# Patient Record
Sex: MALE | Race: WHITE | HISPANIC OR LATINO | Employment: FULL TIME | URBAN - METROPOLITAN AREA
[De-identification: names, ages, dates, MRNs, and addresses within clinical notes are randomized per-mention and may not be internally consistent; named-entity substitution may affect disease eponyms.]

---

## 2018-04-04 ENCOUNTER — APPOINTMENT (EMERGENCY)
Dept: CT IMAGING | Facility: HOSPITAL | Age: 73
End: 2018-04-04
Payer: MEDICARE

## 2018-04-04 ENCOUNTER — HOSPITAL ENCOUNTER (EMERGENCY)
Facility: HOSPITAL | Age: 73
Discharge: HOME | End: 2018-04-05
Attending: EMERGENCY MEDICINE | Admitting: EMERGENCY MEDICINE
Payer: MEDICARE

## 2018-04-04 ENCOUNTER — APPOINTMENT (EMERGENCY)
Dept: RADIOLOGY | Facility: HOSPITAL | Age: 73
End: 2018-04-04
Payer: MEDICARE

## 2018-04-04 ENCOUNTER — OFFICE VISIT (OUTPATIENT)
Dept: URGENT CARE | Facility: MEDICAL CENTER | Age: 73
End: 2018-04-04
Payer: MEDICARE

## 2018-04-04 VITALS
DIASTOLIC BLOOD PRESSURE: 72 MMHG | OXYGEN SATURATION: 99 % | SYSTOLIC BLOOD PRESSURE: 126 MMHG | TEMPERATURE: 98.9 F | HEART RATE: 87 BPM | WEIGHT: 138 LBS

## 2018-04-04 DIAGNOSIS — I10 HYPERTENSION: ICD-10-CM

## 2018-04-04 DIAGNOSIS — R53.1 WEAKNESS: Primary | ICD-10-CM

## 2018-04-04 DIAGNOSIS — R60.0 LOWER EXTREMITY EDEMA: ICD-10-CM

## 2018-04-04 LAB
ALBUMIN SERPL BCP-MCNC: 3.6 G/DL (ref 3.5–5)
ALP SERPL-CCNC: 62 U/L (ref 46–116)
ALT SERPL W P-5'-P-CCNC: 19 U/L (ref 12–78)
ANION GAP SERPL CALCULATED.3IONS-SCNC: 11 MMOL/L (ref 4–13)
APTT PPP: 26 SECONDS (ref 23–35)
AST SERPL W P-5'-P-CCNC: 12 U/L (ref 5–45)
BACTERIA UR QL AUTO: ABNORMAL /HPF
BASOPHILS # BLD AUTO: 0.05 THOUSANDS/ΜL (ref 0–0.1)
BASOPHILS NFR BLD AUTO: 1 % (ref 0–1)
BILIRUB DIRECT SERPL-MCNC: 0.09 MG/DL (ref 0–0.2)
BILIRUB SERPL-MCNC: 0.3 MG/DL (ref 0.2–1)
BILIRUB UR QL STRIP: NEGATIVE
BUN SERPL-MCNC: 24 MG/DL (ref 5–25)
CALCIUM SERPL-MCNC: 10.1 MG/DL (ref 8.3–10.1)
CHLORIDE SERPL-SCNC: 104 MMOL/L (ref 100–108)
CLARITY UR: CLEAR
CO2 SERPL-SCNC: 28 MMOL/L (ref 21–32)
COLOR UR: YELLOW
CREAT SERPL-MCNC: 1.16 MG/DL (ref 0.6–1.3)
EOSINOPHIL # BLD AUTO: 0.46 THOUSAND/ΜL (ref 0–0.61)
EOSINOPHIL NFR BLD AUTO: 5 % (ref 0–6)
ERYTHROCYTE [DISTWIDTH] IN BLOOD BY AUTOMATED COUNT: 13.6 % (ref 11.6–15.1)
GFR SERPL CREATININE-BSD FRML MDRD: 63 ML/MIN/1.73SQ M
GLUCOSE SERPL-MCNC: 127 MG/DL (ref 65–140)
GLUCOSE SERPL-MCNC: 129 MG/DL (ref 65–140)
GLUCOSE UR STRIP-MCNC: NEGATIVE MG/DL
HCT VFR BLD AUTO: 37.5 % (ref 36.5–49.3)
HGB BLD-MCNC: 12 G/DL (ref 12–17)
HGB UR QL STRIP.AUTO: ABNORMAL
HYALINE CASTS #/AREA URNS LPF: ABNORMAL /LPF
INR PPP: 1.06 (ref 0.86–1.16)
KETONES UR STRIP-MCNC: NEGATIVE MG/DL
LACTATE SERPL-SCNC: 3.3 MMOL/L (ref 0.5–2)
LEUKOCYTE ESTERASE UR QL STRIP: NEGATIVE
LYMPHOCYTES # BLD AUTO: 1.82 THOUSANDS/ΜL (ref 0.6–4.47)
LYMPHOCYTES NFR BLD AUTO: 18 % (ref 14–44)
MCH RBC QN AUTO: 27.6 PG (ref 26.8–34.3)
MCHC RBC AUTO-ENTMCNC: 32 G/DL (ref 31.4–37.4)
MCV RBC AUTO: 86 FL (ref 82–98)
MONOCYTES # BLD AUTO: 0.54 THOUSAND/ΜL (ref 0.17–1.22)
MONOCYTES NFR BLD AUTO: 5 % (ref 4–12)
MUCOUS THREADS UR QL AUTO: ABNORMAL
NEUTROPHILS # BLD AUTO: 7.36 THOUSANDS/ΜL (ref 1.85–7.62)
NEUTS SEG NFR BLD AUTO: 72 % (ref 43–75)
NITRITE UR QL STRIP: NEGATIVE
NON-SQ EPI CELLS URNS QL MICRO: ABNORMAL /HPF
NRBC BLD AUTO-RTO: 0 /100 WBCS
NT-PROBNP SERPL-MCNC: 601 PG/ML
PH UR STRIP.AUTO: 5.5 [PH] (ref 4.5–8)
PLATELET # BLD AUTO: 268 THOUSANDS/UL (ref 149–390)
PMV BLD AUTO: 9.7 FL (ref 8.9–12.7)
POTASSIUM SERPL-SCNC: 3.8 MMOL/L (ref 3.5–5.3)
PROT SERPL-MCNC: 7.9 G/DL (ref 6.4–8.2)
PROT UR STRIP-MCNC: ABNORMAL MG/DL
PROTHROMBIN TIME: 14.1 SECONDS (ref 12.1–14.4)
RBC # BLD AUTO: 4.35 MILLION/UL (ref 3.88–5.62)
RBC #/AREA URNS AUTO: ABNORMAL /HPF
SL AMB POCT GLUCOSE BLD: 127
SODIUM SERPL-SCNC: 143 MMOL/L (ref 136–145)
SP GR UR STRIP.AUTO: 1.02 (ref 1–1.03)
TROPONIN I SERPL-MCNC: <0.02 NG/ML
TSH SERPL DL<=0.05 MIU/L-ACNC: 0.91 UIU/ML (ref 0.36–3.74)
UROBILINOGEN UR QL STRIP.AUTO: 0.2 E.U./DL
WBC # BLD AUTO: 10.27 THOUSAND/UL (ref 4.31–10.16)
WBC #/AREA URNS AUTO: ABNORMAL /HPF

## 2018-04-04 PROCEDURE — 36415 COLL VENOUS BLD VENIPUNCTURE: CPT | Performed by: EMERGENCY MEDICINE

## 2018-04-04 PROCEDURE — 82948 REAGENT STRIP/BLOOD GLUCOSE: CPT | Performed by: PHYSICIAN ASSISTANT

## 2018-04-04 PROCEDURE — G0463 HOSPITAL OUTPT CLINIC VISIT: HCPCS | Performed by: PHYSICIAN ASSISTANT

## 2018-04-04 PROCEDURE — 80076 HEPATIC FUNCTION PANEL: CPT | Performed by: EMERGENCY MEDICINE

## 2018-04-04 PROCEDURE — 84484 ASSAY OF TROPONIN QUANT: CPT | Performed by: EMERGENCY MEDICINE

## 2018-04-04 PROCEDURE — 70450 CT HEAD/BRAIN W/O DYE: CPT

## 2018-04-04 PROCEDURE — 99203 OFFICE O/P NEW LOW 30 MIN: CPT | Performed by: PHYSICIAN ASSISTANT

## 2018-04-04 PROCEDURE — 84443 ASSAY THYROID STIM HORMONE: CPT | Performed by: EMERGENCY MEDICINE

## 2018-04-04 PROCEDURE — 85610 PROTHROMBIN TIME: CPT | Performed by: EMERGENCY MEDICINE

## 2018-04-04 PROCEDURE — 85730 THROMBOPLASTIN TIME PARTIAL: CPT | Performed by: EMERGENCY MEDICINE

## 2018-04-04 PROCEDURE — 83605 ASSAY OF LACTIC ACID: CPT | Performed by: EMERGENCY MEDICINE

## 2018-04-04 PROCEDURE — 81001 URINALYSIS AUTO W/SCOPE: CPT | Performed by: EMERGENCY MEDICINE

## 2018-04-04 PROCEDURE — 85025 COMPLETE CBC W/AUTO DIFF WBC: CPT | Performed by: EMERGENCY MEDICINE

## 2018-04-04 PROCEDURE — 93005 ELECTROCARDIOGRAM TRACING: CPT | Performed by: PHYSICIAN ASSISTANT

## 2018-04-04 PROCEDURE — 80048 BASIC METABOLIC PNL TOTAL CA: CPT | Performed by: EMERGENCY MEDICINE

## 2018-04-04 PROCEDURE — 71046 X-RAY EXAM CHEST 2 VIEWS: CPT

## 2018-04-04 PROCEDURE — 87040 BLOOD CULTURE FOR BACTERIA: CPT | Performed by: EMERGENCY MEDICINE

## 2018-04-04 PROCEDURE — 96361 HYDRATE IV INFUSION ADD-ON: CPT

## 2018-04-04 PROCEDURE — 83880 ASSAY OF NATRIURETIC PEPTIDE: CPT | Performed by: EMERGENCY MEDICINE

## 2018-04-04 RX ORDER — ASPIRIN 81 MG/1
81 TABLET ORAL DAILY
COMMUNITY

## 2018-04-04 RX ADMIN — SODIUM CHLORIDE 1000 ML: 0.9 INJECTION, SOLUTION INTRAVENOUS at 21:41

## 2018-04-04 NOTE — PROGRESS NOTES
[address]        NAME: Mira Moise is a 71 y o  female  : 1945    MRN: [id number]  DATE: 2018  TIME: 6:53 PM    Assessment and Plan   Weakness [R53 1]  1  Weakness           Patient Instructions     Pt sent to [facility] for further evaluation and work up that could not be performed at our facility  Pt is stable to drive by car  Pt's friend will be driving  Chief Complaint     Chief Complaint   Patient presents with    Fatigue         History of Present Illness       This is a 72 y/o M c/o weakness/fatigue, confusion, chest palpations and lower extremity edema since early this morning  Pt is Armenian speaking only care giver is translating  Pt has pmh of stroke, Chronic DVT right lower extremity, DM II   Pt denies any chest pain, shortness of breath  Pt's friend reports he no longer seem confused and seems to be his normal self  Pt denies any numbness, tingling, loss of sensation, Ha, ringing in ears, changes in vision  Fatigue   Associated symptoms include fatigue  Review of Systems   Review of Systems   Constitutional: Positive for fatigue           Current Medications       Current Outpatient Prescriptions:     aspirin (ECOTRIN LOW STRENGTH) 81 mg EC tablet, Take 81 mg by mouth daily, Disp: , Rfl:     ATORVASTATIN CALCIUM PO, Take by mouth, Disp: , Rfl:     CARVEDILOL PO, Take by mouth, Disp: , Rfl:     CLOPIDOGREL BISULFATE PO, Take by mouth, Disp: , Rfl:     ENALAPRIL MALEATE PO, Take by mouth, Disp: , Rfl:     HYDROCHLOROTHIAZIDE PO, Take by mouth, Disp: , Rfl:     METFORMIN HCL PO, Take by mouth, Disp: , Rfl:     SITagliptin Phosphate (JANUVIA PO), Take by mouth, Disp: , Rfl:     Current Allergies     Allergies as of 2018    (No Known Allergies)            The following portions of the patient's history were reviewed and updated as appropriate: allergies, current medications, past family history, past medical history, past social history, past surgical history and problem list      Past Medical History:   Diagnosis Date    Diabetes mellitus ([facility] [address] )     Hypertension     Phlebitis     Stroke [facility])        Past Surgical History:   Procedure Laterality Date    ADENOIDECTOMY      CHOLECYSTECTOMY         No family history on file  Medications have been verified  Objective   /72 (Patient Position: Sitting)   Pulse 87   Temp 98 9 °F (37 2 °C) (Temporal)   Wt 62 6 kg (138 lb)   SpO2 99%     ECG: NSR, Nothing to compare to       Physical Exam     Physical Exam

## 2018-04-04 NOTE — PROGRESS NOTES
This am feeling tired  At noon had sluggish speech leg swelling, dizziness  Also feels his heart was racing   No chest pain or SOB

## 2018-04-04 NOTE — PATIENT INSTRUCTIONS
Pt sent to [facility] for further evaluation and work up that could not be performed at our facility  Pt is stable to drive by car  Pt's friend will be driving

## 2018-04-05 VITALS
WEIGHT: 138 LBS | DIASTOLIC BLOOD PRESSURE: 96 MMHG | HEIGHT: 61 IN | BODY MASS INDEX: 26.06 KG/M2 | HEART RATE: 91 BPM | TEMPERATURE: 97.7 F | RESPIRATION RATE: 16 BRPM | SYSTOLIC BLOOD PRESSURE: 202 MMHG | OXYGEN SATURATION: 98 %

## 2018-04-05 LAB
ATRIAL RATE: 81 BPM
LACTATE SERPL-SCNC: 1.2 MMOL/L (ref 0.5–2)
P AXIS: 7 DEGREES
PR INTERVAL: 138 MS
QRS AXIS: -22 DEGREES
QRSD INTERVAL: 80 MS
QT INTERVAL: 368 MS
QTC INTERVAL: 427 MS
T WAVE AXIS: 125 DEGREES
TROPONIN I SERPL-MCNC: <0.02 NG/ML
VENTRICULAR RATE: 81 BPM

## 2018-04-05 PROCEDURE — 99285 EMERGENCY DEPT VISIT HI MDM: CPT

## 2018-04-05 PROCEDURE — 96374 THER/PROPH/DIAG INJ IV PUSH: CPT

## 2018-04-05 PROCEDURE — 93010 ELECTROCARDIOGRAM REPORT: CPT | Performed by: INTERNAL MEDICINE

## 2018-04-05 RX ORDER — FUROSEMIDE 10 MG/ML
40 INJECTION INTRAMUSCULAR; INTRAVENOUS ONCE
Status: COMPLETED | OUTPATIENT
Start: 2018-04-05 | End: 2018-04-05

## 2018-04-05 RX ADMIN — FUROSEMIDE 40 MG: 10 INJECTION, SOLUTION INTRAMUSCULAR; INTRAVENOUS at 00:13

## 2018-04-05 NOTE — DISCHARGE INSTRUCTIONS
We will call you if any of your cultures come back positive or if he needs any antibiotics  Return if any problems  Hipertensión crónica, Cuidados ambulatorios   INFORMACIÓN GENERAL:   La hipertensión crónica  es mansi condición a eulalio plazo en la cual leo presión arterial es más octaviano de lo normal  La presión arterial es la fuerza que ejerce la sandra contra las hathaway de las arterias  La hipertensión es mansi presión arterial de 140/90 o mucho más elevada  Los siguientes son los síntomas más comunes:   · Dolor de judith     · Visión borrosa     · Dolor en el pecho     · [employer] o debilidad    · Dificultad para respirar     · Sangrados nasales  Busque atención médica inmediata al presentar los siguientes síntomas:   · Leslie dolor de judith o pérdida de la visión    · Debilidad en un brazo o mansi pierna     · Confusión o tiene dificultad para hablar claramente    · Siente mansi molestia en leo pecho que parece kiya si lo estuvieran apretando, mansi presión, mansi pesadez o dolor     · De repente se siente mareado o tiene dificultad para respirar    · Dolor o incomodidad en leo espalda, tiffany, mandíbula, estómago o brazo  El tratamiento para la hipertensión crónica  puede incluir un medicamento para baja la presión arterial  También necesitará hacer un cambio en leo estilo de karel  Se debe robe abimbola medicamentos exactamente kiya se lo indicaron  Controle la hipertensión crónica:   · Tómese la presión arterial en leo casa  Siéntese y descanse por 5 minutos antes de tomarse la presión arterial  Extienda leo brazo y apóyelo en mansi superficie plana  Leo brazo debe estar a la misma altura que leo corazón  Siga las instrucciones que vienen con el monitor para la presión arterial o tensiómetro  Si es posible tome por lo menos 2 lecturas de la presión cada vez  Tómese la presión arterial por lo [employer] al día a la misma hora todos los días, por ejemplo mansi en la mañana y la otra en la noche   Mantenga un registro de abimbola lecturas de candelario presión arterial y llévelo consigo a abimbola consultas de control  · Consuma menos sodio  No le añada sodio a los alimentos  Limete el consumo de alimentos que contienen un alto nivel de sodio, kiya los alimentos [place], papitas saladas de [employer], y sinan para sandwich  Candelario proveedor de kristopher puede recomendarle que siga el régimen de alimentación denominada enfoque dietético para disminuir la hipertensión (o DASH, por abimbola siglas en inglés)  El régimen es bajo en sodio, grasas saturadas y las grasas en total  Es octaviano en potasio, calcio y Ashley  · Peg Alba regular  Zara actividad física que sobrepase los 30 minutos al día shelly todos los días de la [place] ayudará a bajar candelario presión arterial  Solicítele a candelario proveedor de [employer] recomiende el plan de actividad física que se ajuste a candelario situación personal      · Limite el consumo de bebidas alcohólicas  Las mujeres deberían limitar candelario consumo de alcohol a 1 trago por día  Los hombres deberían limitar candelario consumo de alcohol a 2 tragos por día  Se considera un trago de alcohol 12 onzas (350 ml) de cerveza, 5 onzas (150 ml) de vino o 1 ½ onza (45 ml) de licor  · No fume  Si usted fuma, nunca es tarde para dejar de fumar  El tabaco puede aumentar candelario presión arterial  El tabaquismo también puede empeorar otras afecciones de kristopher que usted padezca las cuales pueden aumentar candelario riesgo de presentar hipertensión  Solicite a candelario proveedor de [employer] información si requiere ayuda para dejar de fumar  Stephenson Arley a candelario alicia de control con candelario proveedor de [employer] se lo indicaron:  Usted necesitará regresar para que le revisen candelario presión arterial y para que le ordenen otros exámenes de laboratorio  Escriba las preguntas que tenga para que recuerde hacerlas arminda abimbola consultas médicas  ACUERDOS SOBRE CANDELARIO CUIDADO:   Mnio tiene el derecho de participar en la planificación de candelario cuidado   Aprenda todo lo que pueda [facility] y kiya darle [place]  Discuta con abimbola médicos abimbola opciones de tratamiento para juntos decidir el cuidado que usted quiere recibir  Usted siempre tiene el derecho a rechazar leo tratamiento  Esta información es sólo para uso en educación  Leo intención no es darle un consejo médico sobre enfermedades o tratamientos  Colsulte con leo Alben Nicolás farmacéutico antes de seguir cualquier régimen médico para saber si es seguro y efectivo para usted  © 2014 [address] is for End User's use only and may not be sold, redistributed or otherwise used for commercial purposes  All illustrations and images included in CareNotes® are the copyrighted property of A D A M , Inc  or Jin Buchanan  Debilidad   LO QUE NECESITA SABER:   La debilidad es la pérdida de fuerza muscular  Podría ser causada por problemas en el cerebro, los nervios o músculos  [employer] física y mental kiya un problema del corazón, un embarazo, la deshidratación o la depresión también podrían causar debilidad  Zara reacción a ciertos medicamentos pueden causar debilidad  Algunas partes de leo cuerpo podrían debilitarse cuando usted [place] un yeso o férula o ha tenido que guardar cama por un eulalio periodo de [place]  INSTRUCCIONES SOBRE EL [facility]:   Llame al 911 en darell de presentar lo siguiente:   · Usted tiene alguno de los siguientes signos de derrame cerebral:      ¨ Adormecimiento o caída de un lado de leo tamy     ¨ Debilidad en un brazo o zara pierna    ¨ Confusión o debilidad para hablar    ¨ Mareos o dolor de judith intenso, o pérdida de la visión  · Usted pierde sensibilidad en la parte del cuerpo debilitada  · Usted siente sensaciones kiya de choques eléctricos que le bajan por los brazos y piernas cuando flexiona o mueve el tiffany  · Usted tiene dificultad repentina para hablar, tragar o respirar o la dificultad que ya tenía más esteban FAUSTO    Regrese a la pacheco de emergencias si:   · Usted tiene un david dolor en la espalda, brazos o piernas que va empeorando  · Usted tiene debilidad muscular y pérdida de movimiento repentinos o estos problemas [employer]       · Usted no puede controlar abimbola ganas de orinar o de tener mansi deposición intestinal   Pregúntele a leo médico qué vitaminas y minerales son adecuados para usted  · Usted se siente deprimido o ansioso  · Usted tiene preguntas o inquietudes acerca de leo condición o cuidado  Controle la debilidad:   · Use dispositivos de apoyo kiya se le indique  Estos artículos ayudan a protegerlo de que sufra mansi lesión  Unos ejemplos incluyen un caminador o bastón  Pídale a alguien que instale barras de seguridad en la casa  Las barras le ayudaran a mantener más el equilibrio cuando sale de la mayur del baño o se pone de pie después de usar el sanitario  Use un asiento de ducha  Siéntese en el inodoro o en otra silla para secarse y ponerse la ropa  Siempre es mejor que solicite la ayuda de alguien para subir y bajar gradas si abimbola piernas están débiles  · Acuda a fisioterapia o terapia ocupacional si se lo indican  Un fisioterapeuta puede enseñarle ejercicios para ayudar a fortalecer los músculos  Un terapeuta ocupacional puede enseñarle cómo hacer abimbola actividades cotidianas con más facilidad  Por [place] y los tenedores livianos pueden ser más fáciles de usar si tiene debilidad en la [place]  También puede aprender a organizar abimbola artículos del hogar para no  objetos pesados  · Reparta el tiempo igualmente entre el ejercicio y el descanso  El ejercicio puede aumentar la fuerza muscular y la [place]  No krishna ejercicio por largos periodos de tiempo a la vez  [place]well descansos con frecuencia  Al hacer demasiado ejercicio puede causar mansi tensión muscular o hacer que se sienta más cansado  Pregunte a leo médico cuál es la cantidad de ejercicio que es la [place] para usted  · Consuma alimentos saludables y variados  [place] demasiado o comer muy poco podría causarle debilidad o cansancio  Pregúntele a little médico cuáles son las cantidades saludables de comida en little darell  [address] frutas, verduras, pan integral, productos lácteos descremados, carne magra y pescado, semillas y legumbres cocinadas  · No fume  La nicotina y otros químicos en los cigarrillos y puros pueden empeorar abimbola síntomas y causar daño a los pulmones  Pida información a little médico si usted actualmente fuma y necesita ayuda para dejar de fumar  Los cigarrillos electrónicos o tabaco sin humo todavía contienen nicotina  Consulte con little médico antes de [employer]  · No use cafeína, ni alcohol ni drogas ilícitas  Estos pueden causar espasmos musculares, lo cual podría conllevar a un empeoramiento de little debilidad  Acuda a abimbola consultas de control con ilttle médico según le indicaron  Anote abimbola preguntas para que se acuerde de hacerlas arminda abimbola visitas  © 2017 [address] is for End User's use only and may not be sold, redistributed or otherwise used for commercial purposes  All illustrations and images included in CareNotes® are the copyrighted property of A D A M , Inc  or Jin Buchanan  Esta información es sólo para uso en educación  Little intención no es darle un consejo médico sobre enfermedades o tratamientos  Colsulte con little Melven Rimes farmacéutico antes de seguir cualquier régimen médico para saber si es seguro y efectivo para usted  Edema de la pierna   LO QUE NECESITA SABER:   El edema de la pierna es mansi inflamación causada por la acumulación de líquido  Las piernas pueden hincharse si usted está sentado o de pie arminda largos períodos de [place], está [place], o está lesionado  La inflamación también se puede presentar si usted tiene insuficiencia cardíaca o problemas de la circulación   [place] significa que little corazón no bombea sandra a little cuerpo [employer] debería  INSTRUCCIONES SOBRE EL [facility]:   Cuidados personales:   · Eleve cindy piernas:  Levante cindy piernas por encima del nivel de little corazón tan seguido kiya pueda  [place] va a disminuir inflamación y el dolor  Coloque cindy piernas sobre almohadas o cobijas para mantenerlas elevadas cómodamente  · Use medias de compresión:  Estas medias apretadas generan presión en cindy piernas para promover la circulación de sandra y prevenir un coágulo de [place]  Use las medias arminda el día  No las use al dormir  · Aplique calor:  El calor ayuda a disminuir el dolor y la inflamación  Aplíquese calor en el área lesionada arminda 20 a 30 minutos cada 2 horas arminda la cantidad de [employer] indiquen  · Manténgase activo:  No esté de pie o sentado por mucho tiempo  Pregunte a little médico acerca del mejor plan de ejercicio para usted  · Consuma alimentos saludables:  Los alimentos saludables incluyen frutas, verduras, pan integral, productos lácteos bajos en grasa, frijoles, sinan magras y pescado  Pregunte si necesita seguir mansi dieta especial  Limite la sae  La sal hará que little cuerpo retenga aún más líquidos  Acuda a cindy consultas de control con little médico según le indicaron  Anote cindy preguntas para que se acuerde de hacerlas arminda cindy visitas  Pregúntele a little [place] vitaminas y minerales son adecuados para usted  · Tiene fiebre o se siente más cansado de lo normal     · Las venas en cindy piernas se heaven más grandes de lo normal  Se podrían notar llenas o estar abultadas  · Cindy piernas le pican o se sienten pesadas  · Tiene áreas o llagas [place] o kimmy en cindy piernas  Little piel podría parecer con hoyuelos o podría tener hendiduras  · Está subiendo de [place]  · Tiene dificultad para  los tobillos  · La inflamación no desaparece, u otras partes de little cuerpo se hinchan  · Usted tiene preguntas o inquietudes acerca de little condición o cuidado    Regrese a la pacheco de emergencias si: · No puede caminar  · Se siente desmayar o confundido  · Little piel se ha puesto suzanne o eda  · Little pierna se siente cálida, sensible y [place]  Puede que estén inflamados y rojos  · Tiene dolor de pecho o dificultad para respirar que es peor que cuando se acostó  · De repente se siente mareado y tiene dificultad para respirar  · Le viene repentinamente un nuevo dolor en el pecho  Es probable que sienta más dolor cuando respira profundo o tose  Es posible que también expectore Sarmad mcclure  © 2017 [address] Information is for End User's use only and may not be sold, redistributed or otherwise used for commercial purposes  All illustrations and images included in CareNotes® are the copyrighted property of A D A YI , Inc  or Jin Buchanan  Esta información es sólo para uso en educación  Little intención no es darle un consejo médico sobre enfermedades o tratamientos  Colsulte con little Zelpha Roch farmacéutico antes de seguir cualquier régimen médico para saber si es seguro y efectivo para usted

## 2018-04-05 NOTE — ED PROVIDER NOTES
History  Chief Complaint   Patient presents with    Weakness - Generalized     Pt presents to ED for generalized weakness, fatigue, and increased leg swelling  Pt states it also feels like his heart is racing  Symptoms started today      HPI   59-year-old  male with a chief complaint from his daughter that patient was very weak this morning and did not get out of bed till 1:00 pm   Daughter states that patient was complaining that his heart was racing  Patient was seen at urgent care and they sent patient over here because of his history strokes in the past   Daughter also states that patient is complaining of increased swelling in his legs  Patient has no complaints at the present time  Prior to Admission Medications   Prescriptions Last Dose Informant Patient Reported? Taking? ATORVASTATIN CALCIUM PO  Friend Yes No   Sig: Take by mouth   CARVEDILOL PO  Friend Yes No   Sig: Take by mouth   CLOPIDOGREL BISULFATE PO  Friend Yes No   Sig: Take by mouth   ENALAPRIL MALEATE PO  Friend Yes No   Sig: Take by mouth   HYDROCHLOROTHIAZIDE PO  Friend Yes No   Sig: Take by mouth   METFORMIN HCL PO  Friend Yes No   Sig: Take by mouth   SITagliptin Phosphate (JANUVIA PO)  Friend Yes No   Sig: Take by mouth   aspirin (ECOTRIN LOW STRENGTH) 81 mg EC tablet  Friend Yes No   Sig: Take 81 mg by mouth daily      Facility-Administered Medications: None       Past Medical History:   Diagnosis Date    Diabetes mellitus ([facility] [address] )     Hypertension     Phlebitis     Stroke [facility])        Past Surgical History:   Procedure Laterality Date    ADENOIDECTOMY      CHOLECYSTECTOMY         History reviewed  No pertinent family history  I have reviewed and agree with the history as documented  Social History   Substance Use Topics    Smoking status: Never Smoker    Smokeless tobacco: Never Used    Alcohol use No        Review of Systems   Constitutional: Negative for diaphoresis, fatigue and fever     HENT: Negative for congestion, ear pain, nosebleeds and sore throat  Eyes: Negative for photophobia, pain, discharge and visual disturbance  Respiratory: Negative for cough, choking, chest tightness, shortness of breath and wheezing  Cardiovascular: Positive for palpitations  Negative for chest pain  Gastrointestinal: Negative for abdominal distention, abdominal pain, diarrhea and vomiting  Genitourinary: Negative for dysuria, flank pain and frequency  Musculoskeletal: Negative for back pain, gait problem and joint swelling  Skin: Negative for color change and rash  Neurological: Positive for weakness  Negative for dizziness, syncope and headaches  Psychiatric/Behavioral: Negative for behavioral problems and confusion  The patient is not nervous/anxious  All other systems reviewed and are negative  Physical Exam  ED Triage Vitals [04/04/18 1938]   Temperature Pulse Respirations Blood Pressure SpO2   97 7 °F (36 5 °C) 86 17 (!) 173/90 99 %      Temp Source Heart Rate Source Patient Position - Orthostatic VS BP Location FiO2 (%)   Oral Monitor Sitting Left arm --      Pain Score       --           Orthostatic Vital Signs  Vitals:    04/04/18 2145 04/04/18 2330 04/05/18 0000 04/05/18 0030   BP: (!) 194/92 (!) 199/93 (!) 202/96    Pulse: 85 79 79 91   Patient Position - Orthostatic VS: Lying          Physical Exam   Constitutional: He is oriented to person, place, and time  He appears well-developed and well-nourished  78-year-old  male sitting on the stretcher acute distress  Patient did not speak any English but does follow all commands  Daughter is the    HENT:   Head: Normocephalic and atraumatic  Mouth/Throat: Oropharynx is clear and moist    Eyes: EOM are normal  Pupils are equal, round, and reactive to light  Neck: Normal range of motion  Neck supple  Cardiovascular: Normal rate, regular rhythm and normal heart sounds      Pulmonary/Chest: Effort normal and breath sounds normal  No respiratory distress  He has no wheezes  He exhibits no tenderness  Abdominal: Soft  Bowel sounds are normal  There is no tenderness  There is no rebound and no guarding  Musculoskeletal: Normal range of motion  Neurological: He is alert and oriented to person, place, and time  No cranial nerve deficit  He exhibits normal muscle tone  Coordination normal    Skin: Skin is warm and dry  Psychiatric: He has a normal mood and affect  Nursing note and vitals reviewed  ED Medications  Medications   sodium chloride 0 9 % bolus 1,000 mL (0 mL Intravenous Stopped 4/4/18 2311)   furosemide (LASIX) injection 40 mg (40 mg Intravenous Given 4/5/18 0013)       Diagnostic Studies  Results Reviewed     Procedure Component Value Units Date/Time    Lactic acid, plasma [[id number]]  (Normal) Collected:  04/04/18 2347    Lab Status:  Final result Specimen:  Blood from Arm, Right Updated:  04/05/18 0016     LACTIC ACID 1 2 mmol/L     Narrative:         Result may be elevated if tourniquet was used during collection  Troponin I [[id number]]  (Normal) Collected:  04/04/18 2347    Lab Status:  Final result Specimen:  Blood from Arm, Right Updated:  04/05/18 0011     Troponin I <0 02 ng/mL     Narrative:         [employer] Chemistry analyzer 99% cutoff is > 0 04 ng/mL in network labs    o cTnI 99% cutoff is useful only when applied to patients in the clinical setting of myocardial ischemia  o cTnI 99% cutoff should be interpreted in the context of clinical history, ECG findings and possibly cardiac imaging to establish correct diagnosis  o cTnI 99% cutoff may be suggestive but clearly not indicative of a coronary event without the clinical setting of myocardial ischemia      Troponin I [[id number]]     Lab Status:  No result Specimen:  Blood     Urine Microscopic [[id number]]  (Abnormal) Collected:  04/04/18 2314    Lab Status:  Final result Specimen:  Urine from Urine, Other Updated:  04/04/18 2332     RBC, UA 0-1 (A) /hpf      WBC, UA None Seen /hpf      Epithelial Cells None Seen /hpf      Bacteria, UA None Seen /hpf      Hyaline Casts, UA 0-1 (A) /lpf      MUCOUS THREADS None Seen (A)    UA w Reflex to Microscopic w Reflex to Culture [[id number]]  (Abnormal) Collected:  04/04/18 2314    Lab Status:  Final result Specimen:  Urine from Urine, Other Updated:  04/04/18 2322     Color, UA Yellow     Clarity, UA Clear     Specific Gravity, UA 1 025     pH, UA 5 5     Leukocytes, UA Negative     Nitrite, UA Negative     Protein,  (2+) (A) mg/dl      Glucose, UA Negative mg/dl      Ketones, UA Negative mg/dl      Urobilinogen, UA 0 2 E U /dl      Bilirubin, UA Negative     Blood, UA Trace-Intact (A)    Blood culture #2 [[id number]] Collected:  04/04/18 2315    Lab Status: In process Specimen:  Blood from Arm, Left Updated:  04/04/18 2320    Blood culture #1 [[id number]]     Lab Status:  No result Specimen:  Blood     Lactic acid, plasma [[id number]]  (Abnormal) Collected:  04/04/18 2136    Lab Status:  Final result Specimen:  Blood from Arm, Right Updated:  04/04/18 2213     LACTIC ACID 3 3 (HH) mmol/L     Narrative:         Result may be elevated if tourniquet was used during collection  Hepatic function panel [[id number]]  (Normal) Collected:  04/04/18 2136    Lab Status:  Final result Specimen:  Blood from Arm, Right Updated:  04/04/18 2210     Total Bilirubin 0 30 mg/dL      Bilirubin, Direct 0 09 mg/dL      Alkaline Phosphatase 62 U/L      AST 12 U/L      ALT 19 U/L      Total Protein 7 9 g/dL      Albumin 3 6 g/dL     TSH [[id number]]  (Normal) Collected:  04/04/18 2136    Lab Status:  Final result Specimen:  Blood from Arm, Right Updated:  04/04/18 2210     TSH 3RD GENERATON 0 908 uIU/mL     Narrative:         Patients undergoing fluorescein dye angiography may retain small amounts of fluorescein in the body for 48-72 hours post procedure  Samples containing fluorescein can produce falsely depressed TSH values   If the patient had this procedure,a specimen should be resubmitted post fluorescein clearance  B-type natriuretic peptide [[id number]]  (Abnormal) Collected:  04/04/18 2136    Lab Status:  Final result Specimen:  Blood from Arm, Right Updated:  04/04/18 2210     NT-proBNP 601 (H) pg/mL     Troponin I [[id number]]  (Normal) Collected:  04/04/18 2136    Lab Status:  Final result Specimen:  Blood from Arm, Right Updated:  04/04/18 2205     Troponin I <0 02 ng/mL     Narrative:         [employer] Chemistry analyzer 99% cutoff is > 0 04 ng/mL in network labs    o cTnI 99% cutoff is useful only when applied to patients in the clinical setting of myocardial ischemia  o cTnI 99% cutoff should be interpreted in the context of clinical history, ECG findings and possibly cardiac imaging to establish correct diagnosis  o cTnI 99% cutoff may be suggestive but clearly not indicative of a coronary event without the clinical setting of myocardial ischemia  Basic metabolic panel [[id number]] Collected:  04/04/18 2136    Lab Status:  Final result Specimen:  Blood from Arm, Right Updated:  04/04/18 2202     Sodium 143 mmol/L      Potassium 3 8 mmol/L      Chloride 104 mmol/L      CO2 28 mmol/L      Anion Gap 11 mmol/L      BUN 24 mg/dL      Creatinine 1 16 mg/dL      Glucose 129 mg/dL      Calcium 10 1 mg/dL      eGFR 63 ml/min/1 73sq m     Narrative:         National Kidney Disease Education Program recommendations are as follows:  GFR calculation is accurate only with a steady state creatinine  Chronic Kidney disease less than 60 ml/min/1 73 sq  meters  Kidney failure less than 15 ml/min/1 73 sq  meters      Protime-INR [[id number]]  (Normal) Collected:  04/04/18 2136    Lab Status:  Final result Specimen:  Blood from Arm, Right Updated:  04/04/18 2155     Protime 14 1 seconds      INR 1 06    APTT [[id number]]  (Normal) Collected:  04/04/18 2136    Lab Status:  Final result Specimen:  Blood from Arm, Right Updated:  04/04/18 2155     PTT 26 seconds Narrative: Therapeutic Heparin Range = 60-90 seconds    CBC and differential [[id number]]  (Abnormal) Collected:  04/04/18 2136    Lab Status:  Final result Specimen:  Blood from Arm, Right Updated:  04/04/18 2143     WBC 10 27 (H) Thousand/uL      RBC 4 35 Million/uL      Hemoglobin 12 0 g/dL      Hematocrit 37 5 %      MCV 86 fL      MCH 27 6 pg      MCHC 32 0 g/dL      RDW 13 6 %      MPV 9 7 fL      Platelets 981 Thousands/uL      nRBC 0 /100 WBCs      Neutrophils Relative 72 %      Lymphocytes Relative 18 %      Monocytes Relative 5 %      Eosinophils Relative 5 %      Basophils Relative 1 %      Neutrophils Absolute 7 36 Thousands/µL      Lymphocytes Absolute 1 82 Thousands/µL      Monocytes Absolute 0 54 Thousand/µL      Eosinophils Absolute 0 46 Thousand/µL      Basophils Absolute 0 05 Thousands/µL                  XR chest 2 views   ED Interpretation by Liana Hale DO (04/05 0003)   NAD      CT head without contrast   Final Result by James Sena MD (04/04 2233)         1  Chronic left MCA territory infarct in the inferior aspect of the motor strip  Microangiopathic disease  2   No acute intracranial hemorrhage, mass effect or extra-axial collection  3   Chronic paranasal sinus disease  Workstation performed: [id number]                    Procedures  ECG 12 Lead Documentation  Date/Time: 4/5/2018 12:16 AM  Performed by: Jo Montana  Authorized by: Jo Montana     Indications / Diagnosis:  Weakness  ECG reviewed by me, the ED Provider: yes    Patient location:  ED  Interpretation:     Interpretation: normal    Rate:     ECG rate assessment: normal    Rhythm:     Rhythm: sinus rhythm    ST segments:     ST segments:  Non-specific  T waves:     T waves: flattening      Flattening:  V5 and V6           Phone Contacts  ED Phone Contact    ED Course  ED Course           patient examined multiple times - patient still has no complaints feeling fine    Patient ate some crackers and juice  Awaiting 2nd lactic acid  Explained to daughter with patient that we did a blood culture and if there was anything that came back positive we would give them a call  Patient is also hypertensive and because of his pedal edema I will give him 1 dose of IV Lasix here prior to discharging patient  Daughter was given a copy of patient's CT scan  Repeat lactic acid was 1 1 dropped from 3 3 after IV hydration  Patient was still asymptomatic and felt fine  Patient was discharged and was able to ambulate without difficulty  MDM  CritCare Time     Differential diagnosis includes:  1  Weakness  2  Rule out CVA  3  Electrolyte imbalance  4  Palpitations  5  Rule out MI  6  Duque Schanz UTI  7  B/l lower extremity edema    Disposition  Final diagnoses:   Weakness   Hypertension   Lower extremity edema     Time reflects when diagnosis was documented in both MDM as applicable and the Disposition within this note     Time User Action Codes Description Comment    4/5/2018 12:13 AM Wallie Fan Add [R53 1] Weakness     4/5/2018 12:13 AM Wallie Fan Add [I10] Hypertension     4/5/2018 12:23 AM Wallie Fan Add [R60 0] Lower extremity edema       ED Disposition     ED Disposition Condition Comment    Discharge  [address] discharge to home/self care      Condition at discharge: Good        Follow-up Information     Follow up With Specialties Details Why Contact Info    Grecia Yepez MD Family Medicine In 1 week  [address]  [phone]          Discharge Medication List as of 4/5/2018 12:25 AM      CONTINUE these medications which have NOT CHANGED    Details   aspirin (ECOTRIN LOW STRENGTH) 81 mg EC tablet Take 81 mg by mouth daily, Historical Med      ATORVASTATIN CALCIUM PO Take by mouth, Historical Med      CARVEDILOL PO Take by mouth, Historical Med      CLOPIDOGREL BISULFATE PO Take by mouth, Historical Med      ENALAPRIL MALEATE PO Take by mouth, Historical Med      HYDROCHLOROTHIAZIDE PO Take by mouth, Historical Med      METFORMIN HCL PO Take by mouth, Historical Med      SITagliptin Phosphate (JANUVIA PO) Take by mouth, Historical Med           No discharge procedures on file      ED Provider  Electronically Signed by           Eduardo Garcia DO  04/05/18 6883

## 2018-04-05 NOTE — ED NOTES
Patient's daughter at bedside refusing secondary set of blood cultures for the patient, advising he does not want any more needle sticks   Patient's nurse updated with refusal       Ross Notice  04/04/18 5302

## 2018-04-10 LAB — BACTERIA BLD CULT: NORMAL

## 2022-04-07 ENCOUNTER — NURSING HOME VISIT (OUTPATIENT)
Dept: FAMILY MEDICINE CLINIC | Facility: CLINIC | Age: 77
End: 2022-04-07
Payer: MEDICARE

## 2022-04-07 DIAGNOSIS — R26.9 GAIT ABNORMALITY: ICD-10-CM

## 2022-04-07 DIAGNOSIS — I10 BENIGN ESSENTIAL HYPERTENSION: ICD-10-CM

## 2022-04-07 DIAGNOSIS — E78.2 MIXED HYPERLIPIDEMIA: ICD-10-CM

## 2022-04-07 DIAGNOSIS — E11.9 TYPE 2 DIABETES MELLITUS WITHOUT COMPLICATION, WITHOUT LONG-TERM CURRENT USE OF INSULIN (HCC): ICD-10-CM

## 2022-04-07 DIAGNOSIS — Z78.9 LANGUAGE BARRIER: ICD-10-CM

## 2022-04-07 DIAGNOSIS — I63.419 CEREBROVASCULAR ACCIDENT (CVA) DUE TO EMBOLISM OF MIDDLE CEREBRAL ARTERY, UNSPECIFIED BLOOD VESSEL LATERALITY (HCC): Primary | ICD-10-CM

## 2022-04-07 PROCEDURE — 99306 1ST NF CARE HIGH MDM 50: CPT | Performed by: FAMILY MEDICINE

## 2022-04-18 VITALS
BODY MASS INDEX: 19.66 KG/M2 | SYSTOLIC BLOOD PRESSURE: 116 MMHG | HEART RATE: 70 BPM | WEIGHT: 118 LBS | DIASTOLIC BLOOD PRESSURE: 62 MMHG | HEIGHT: 65 IN | RESPIRATION RATE: 16 BRPM | TEMPERATURE: 97.8 F

## 2022-04-18 PROBLEM — R80.9 ASYMPTOMATIC PROTEINURIA: Status: ACTIVE | Noted: 2018-03-05

## 2022-04-18 PROBLEM — D50.9 IRON DEFICIENCY ANEMIA: Status: ACTIVE | Noted: 2018-03-05

## 2022-04-18 PROBLEM — R26.9 GAIT ABNORMALITY: Status: ACTIVE | Noted: 2022-04-18

## 2022-04-18 PROBLEM — Z78.9 LANGUAGE BARRIER: Status: ACTIVE | Noted: 2022-04-18

## 2022-04-18 NOTE — ASSESSMENT & PLAN NOTE
Patient speaks little [place] and I do understand some Turkish today  Pt able to communicate needs to me today

## 2022-04-18 NOTE — PROGRESS NOTES
Assessment/Plan:         Problem List Items Addressed This Visit        Endocrine    Type 2 diabetes mellitus without complication ([facility] )     Arnie to check A1C and start diabetic diet and monitor sugars  No results found for: HGBA1C            Cardiovascular and Mediastinum    Benign essential hypertension     Patient is stable with current anti-hypertensive medicine and continue to follow a low sodium diet and take current medication  All questions about this condition were answered today  Other    Hyperlipidemia     Patient  is stable with current medication and we discussed a low fat low cholesterol diet  Weight loss also discussed for this will help lower cholesterol also  Recheck lipids in 6 months  Gait abnormality     Nees to start PT/OT         Language barrier     Patient speaks little [place] and I do understand some Slovak today  Pt able to communicate needs to me today  Other Visit Diagnoses     Cerebrovascular accident (CVA) due to embolism of middle cerebral artery, unspecified blood vessel laterality ([facility] )    -  Primary            Subjective:      Patient ID: Landry Bailey is a 77 y o  male  This is a 27-year-old  male admitted to the skilled nursing facility for rehab status post hospitalization  Patient has multiple medical problems which include hypertension history of CVA involving the middle cerebral artery gait dysfunction weakness history of phlebitis type 2 diabetes and language barrier  Patient is in good spirits today was able to communicate his needs to me  I do understand some of his Slovak and he is with no current pain and has a good sense of humor  His medicines have been reviewed and renewed  Also will get a see about checking his A1c to see where he is at with sugar control we will monitor sugars while he is in the facility    Patient will need physical therapy as well as occupational therapy to maximize his ADLs and to get stronger so he can go back home with his caretaker  The following portions of the patient's history were reviewed and updated as appropriate:   Past Medical History:  He has a past medical history of Diabetes mellitus ([facility] [address] ), Hypertension, Phlebitis, and Stroke ([facility] [address] )  ,  _______________________________________________________________________  Medical Problems:  does not have any pertinent problems on file ,  _______________________________________________________________________  Past Surgical History:   has a past surgical history that includes Cholecystectomy and ADENOIDECTOMY ,  _______________________________________________________________________  Family History:  family history is not on file ,  _______________________________________________________________________  Social History:   reports that he has never smoked  He has never used smokeless tobacco  He reports that he does not drink alcohol and does not use drugs  ,  _______________________________________________________________________  Allergies:  has No Known Allergies     _______________________________________________________________________  Current Outpatient Medications   Medication Sig Dispense Refill    aspirin (ECOTRIN LOW STRENGTH) 81 mg EC tablet Take 81 mg by mouth daily      ATORVASTATIN CALCIUM PO Take by mouth      CARVEDILOL PO Take by mouth      CLOPIDOGREL BISULFATE PO Take by mouth      ENALAPRIL MALEATE PO Take by mouth      HYDROCHLOROTHIAZIDE PO Take by mouth      METFORMIN HCL PO Take by mouth      SITagliptin Phosphate (JANUVIA PO) Take by mouth       No current facility-administered medications for this visit      _______________________________________________________________________  Review of Systems   Constitutional: Negative for activity change, appetite change, chills, fatigue, fever and unexpected weight change     HENT: Negative for congestion, ear pain, hearing loss, mouth sores, postnasal drip, sinus pressure, sinus pain, sneezing and sore throat  Respiratory: Negative for apnea, cough, shortness of breath and wheezing  Cardiovascular: Negative for chest pain, palpitations and leg swelling  Gastrointestinal: Negative for abdominal pain, constipation, diarrhea, nausea and vomiting  Endocrine: Negative for cold intolerance and heat intolerance  Genitourinary: Negative for dysuria, frequency and hematuria  Musculoskeletal: Positive for gait problem  Negative for arthralgias, back pain, joint swelling and neck pain  Skin: Negative for rash  Neurological: Positive for weakness  Negative for dizziness and numbness  Hematological: Does not bruise/bleed easily  Psychiatric/Behavioral: Negative for agitation, behavioral problems, confusion, hallucinations and sleep disturbance  The patient is not nervous/anxious  Objective:  Vitals:    04/07/22 1153   BP: 116/62   Pulse: 70   Resp: 16   Temp: 97 8 °F (36 6 °C)   Weight: 53 5 kg (118 lb)   Height: 5' 5" (1 651 m)     Body mass index is 19 64 kg/m²  Physical Exam  Vitals and nursing note reviewed  Constitutional:       Appearance: He is well-developed  HENT:      Head: Normocephalic and atraumatic  Nose: Nose normal       Mouth/Throat:      Mouth: Mucous membranes are moist    Eyes:      General: No scleral icterus  Conjunctiva/sclera: Conjunctivae normal       Pupils: Pupils are equal, round, and reactive to light  Neck:      Thyroid: No thyromegaly  Cardiovascular:      Rate and Rhythm: Normal rate and regular rhythm  Heart sounds: Normal heart sounds  Pulmonary:      Effort: Pulmonary effort is normal  No respiratory distress  Breath sounds: Normal breath sounds  No wheezing  Abdominal:      General: Bowel sounds are normal       Palpations: Abdomen is soft  Tenderness: There is no abdominal tenderness  There is no guarding or rebound  Musculoskeletal:         General: Normal range of motion        Cervical back: Normal range of motion and neck supple  Skin:     General: Skin is warm and dry  Findings: No rash  Neurological:      Mental Status: He is alert and oriented to person, place, and time  Motor: Weakness present  Gait: Gait abnormal    Psychiatric:         Mood and Affect: Mood normal          Behavior: Behavior normal          Thought Content:  Thought content normal          Judgment: Judgment normal

## 2022-04-28 ENCOUNTER — NURSING HOME VISIT (OUTPATIENT)
Dept: FAMILY MEDICINE CLINIC | Facility: CLINIC | Age: 77
End: 2022-04-28
Payer: MEDICARE

## 2022-04-28 VITALS
RESPIRATION RATE: 18 BRPM | WEIGHT: 119 LBS | BODY MASS INDEX: 19.83 KG/M2 | HEIGHT: 65 IN | HEART RATE: 72 BPM | TEMPERATURE: 97.1 F | SYSTOLIC BLOOD PRESSURE: 126 MMHG | DIASTOLIC BLOOD PRESSURE: 70 MMHG

## 2022-04-28 DIAGNOSIS — E11.9 TYPE 2 DIABETES MELLITUS WITHOUT COMPLICATION, WITHOUT LONG-TERM CURRENT USE OF INSULIN (HCC): Primary | ICD-10-CM

## 2022-04-28 DIAGNOSIS — R26.9 GAIT ABNORMALITY: ICD-10-CM

## 2022-04-28 DIAGNOSIS — I10 BENIGN ESSENTIAL HYPERTENSION: ICD-10-CM

## 2022-04-28 DIAGNOSIS — E78.2 MIXED HYPERLIPIDEMIA: ICD-10-CM

## 2022-04-28 PROCEDURE — 99316 NF DSCHRG MGMT 30 MIN+: CPT | Performed by: FAMILY MEDICINE

## 2022-04-29 NOTE — ASSESSMENT & PLAN NOTE
Patient is stable with current meds and discussed a low carb diet  Pt  recommended to see eye doctor and foot doctor for routine screening as per protocol  Recheck A1C  and Cr in 3 months  Patient questions answered today about this condtion    No results found for: HGBA1C

## 2022-04-29 NOTE — PROGRESS NOTES
Assessment/Plan:         Problem List Items Addressed This Visit        Endocrine    Type 2 diabetes mellitus without complication ([facility] [address] ) - Primary     Patient is stable with current meds and discussed a low carb diet  Pt  recommended to see eye doctor and foot doctor for routine screening as per protocol  Recheck A1C  and Cr in 3 months  Patient questions answered today about this condtion  No results found for: HGBA1C            Cardiovascular and Mediastinum    Benign essential hypertension     Patient is stable with current anti-hypertensive medicine and continue to follow a low sodium diet and take current medication  All questions about this condition were answered today  Other    Hyperlipidemia     Patient  is stable with current medication and we discussed a low fat low cholesterol diet  Weight loss also discussed for this will help lower cholesterol also  Recheck lipids in 6 months  Gait abnormality     Improved and safe to go home  With [facility] to continue  therapy                 Subjective:      Patient ID: Tamar Pak is a 77 y o  male  This 78-year-old  male seen examined at skilled nursing facility for his discharge to home tomorrow  Patient has multiple medical problems include gait dysfunction history of stroke hypertension phlebitis and type 2 diabetes patient also had lie per lipidemia  Patient did with his physical therapy and is stable to go home with his family  He will continue with visiting nurses for physical therapy as well as occupational therapy  Patient also had 30 day supply of his prescriptions written out for him to go home with  Discussed with the staff at the home his ability to go home and services that will be in place for him  Total time for discharge is approximately 32 minutes        The following portions of the patient's history were reviewed and updated as appropriate:   Past Medical History:  He has a past medical history of Diabetes mellitus ([address] ), Hypertension, Phlebitis, and Stroke ([address] )  ,  _______________________________________________________________________  Medical Problems:  does not have any pertinent problems on file ,  _______________________________________________________________________  Past Surgical History:   has a past surgical history that includes Cholecystectomy and ADENOIDECTOMY ,  _______________________________________________________________________  Family History:  family history is not on file ,  _______________________________________________________________________  Social History:   reports that he has never smoked  He has never used smokeless tobacco  He reports that he does not drink alcohol and does not use drugs  ,  _______________________________________________________________________  Allergies:  has No Known Allergies     _______________________________________________________________________  Current Outpatient Medications   Medication Sig Dispense Refill    aspirin (ECOTRIN LOW STRENGTH) 81 mg EC tablet Take 81 mg by mouth daily      ATORVASTATIN CALCIUM PO Take by mouth      CARVEDILOL PO Take by mouth      CLOPIDOGREL BISULFATE PO Take by mouth      ENALAPRIL MALEATE PO Take by mouth      HYDROCHLOROTHIAZIDE PO Take by mouth      METFORMIN HCL PO Take by mouth      SITagliptin Phosphate (JANUVIA PO) Take by mouth       No current facility-administered medications for this visit      _______________________________________________________________________  Review of Systems   Constitutional: Negative for activity change, appetite change, chills, fatigue, fever and unexpected weight change  HENT: Negative for congestion, ear pain, hearing loss, mouth sores, postnasal drip, sinus pressure, sinus pain, sneezing and sore throat  Respiratory: Negative for apnea, cough, shortness of breath and wheezing  Cardiovascular: Negative for chest pain, palpitations and leg swelling     Gastrointestinal: Negative for abdominal pain, constipation, diarrhea, nausea and vomiting  Endocrine: Negative for cold intolerance and heat intolerance  Genitourinary: Negative for dysuria, frequency and hematuria  Musculoskeletal: Negative for arthralgias, back pain, gait problem, joint swelling and neck pain  Skin: Negative for rash  Neurological: Negative for dizziness, weakness and numbness  Hematological: Does not bruise/bleed easily  Psychiatric/Behavioral: Negative for agitation, behavioral problems, confusion, hallucinations and sleep disturbance  The patient is not nervous/anxious  Objective:  Vitals:    04/28/22 2027   BP: 126/70   Pulse: 72   Resp: 18   Temp: (!) 97 1 °F (36 2 °C)   Weight: 54 kg (119 lb)   Height: 5' 5" (1 651 m)     Body mass index is 19 8 kg/m²  Physical Exam  Vitals and nursing note reviewed  Constitutional:       Appearance: He is well-developed  HENT:      Head: Normocephalic and atraumatic  Nose: Nose normal       Mouth/Throat:      Mouth: Mucous membranes are moist    Eyes:      General: No scleral icterus  Conjunctiva/sclera: Conjunctivae normal       Pupils: Pupils are equal, round, and reactive to light  Neck:      Thyroid: No thyromegaly  Cardiovascular:      Rate and Rhythm: Normal rate and regular rhythm  Heart sounds: Normal heart sounds  Pulmonary:      Effort: Pulmonary effort is normal  No respiratory distress  Breath sounds: Normal breath sounds  No wheezing  Abdominal:      General: Bowel sounds are normal       Palpations: Abdomen is soft  Tenderness: There is no abdominal tenderness  There is no guarding or rebound  Musculoskeletal:         General: Normal range of motion  Cervical back: Normal range of motion and neck supple  Right lower leg: No edema  Left lower leg: No edema  Skin:     General: Skin is warm and dry  Findings: No rash     Neurological:      Mental Status: He is alert and oriented to person, place, and time  Psychiatric:         Mood and Affect: Mood normal          Behavior: Behavior normal          Thought Content:  Thought content normal          Judgment: Judgment normal

## 2022-12-31 ENCOUNTER — HOSPITAL ENCOUNTER (INPATIENT)
Facility: HOSPITAL | Age: 77
LOS: 6 days | Discharge: HOME HEALTH | End: 2023-01-07
Attending: EMERGENCY MEDICINE | Admitting: INTERNAL MEDICINE

## 2022-12-31 ENCOUNTER — APPOINTMENT (EMERGENCY)
Dept: CT IMAGING | Facility: HOSPITAL | Age: 77
End: 2022-12-31

## 2022-12-31 DIAGNOSIS — K80.50 CHOLEDOCHOLITHIASIS: ICD-10-CM

## 2022-12-31 DIAGNOSIS — D64.9 ANEMIA: ICD-10-CM

## 2022-12-31 DIAGNOSIS — K82.8 THICKENING OF WALL OF GALLBLADDER WITH PERICHOLECYSTIC FLUID: ICD-10-CM

## 2022-12-31 DIAGNOSIS — T85.79XA: ICD-10-CM

## 2022-12-31 DIAGNOSIS — N28.9 RENAL INSUFFICIENCY: ICD-10-CM

## 2022-12-31 DIAGNOSIS — D72.829 LEUKOCYTOSIS: ICD-10-CM

## 2022-12-31 DIAGNOSIS — R18.8 ASCITES: ICD-10-CM

## 2022-12-31 DIAGNOSIS — K80.20 CHOLELITHIASIS: ICD-10-CM

## 2022-12-31 DIAGNOSIS — R80.9 ASYMPTOMATIC PROTEINURIA: ICD-10-CM

## 2022-12-31 DIAGNOSIS — E86.0 DEHYDRATION: ICD-10-CM

## 2022-12-31 DIAGNOSIS — R10.9 ABDOMINAL PAIN: Primary | ICD-10-CM

## 2022-12-31 PROBLEM — N18.9 CKD (CHRONIC KIDNEY DISEASE): Status: ACTIVE | Noted: 2022-12-31

## 2022-12-31 PROBLEM — I48.91 A-FIB (HCC): Status: ACTIVE | Noted: 2022-12-31

## 2022-12-31 PROBLEM — F03.90 DEMENTIA (HCC): Status: ACTIVE | Noted: 2022-12-31

## 2022-12-31 LAB
ALBUMIN SERPL BCP-MCNC: 1.5 G/DL (ref 3.5–5)
ALBUMIN SERPL BCP-MCNC: 1.6 G/DL (ref 3.5–5)
ALP SERPL-CCNC: 403 U/L (ref 46–116)
ALP SERPL-CCNC: 460 U/L (ref 46–116)
ALT SERPL W P-5'-P-CCNC: 13 U/L (ref 12–78)
ALT SERPL W P-5'-P-CCNC: 9 U/L (ref 12–78)
ANION GAP SERPL CALCULATED.3IONS-SCNC: 10 MMOL/L (ref 4–13)
ANION GAP SERPL CALCULATED.3IONS-SCNC: 7 MMOL/L (ref 4–13)
AST SERPL W P-5'-P-CCNC: 17 U/L (ref 5–45)
AST SERPL W P-5'-P-CCNC: 17 U/L (ref 5–45)
BACTERIA UR QL AUTO: NORMAL /HPF
BASOPHILS # BLD AUTO: 0.01 THOUSANDS/ÂΜL (ref 0–0.1)
BASOPHILS # BLD AUTO: 0.01 THOUSANDS/ÂΜL (ref 0–0.1)
BASOPHILS NFR BLD AUTO: 0 % (ref 0–1)
BASOPHILS NFR BLD AUTO: 0 % (ref 0–1)
BILIRUB SERPL-MCNC: 0.46 MG/DL (ref 0.2–1)
BILIRUB SERPL-MCNC: 0.59 MG/DL (ref 0.2–1)
BILIRUB UR QL STRIP: NEGATIVE
BUN SERPL-MCNC: 38 MG/DL (ref 5–25)
BUN SERPL-MCNC: 39 MG/DL (ref 5–25)
CALCIUM ALBUM COR SERPL-MCNC: 9.6 MG/DL (ref 8.3–10.1)
CALCIUM ALBUM COR SERPL-MCNC: 9.7 MG/DL (ref 8.3–10.1)
CALCIUM SERPL-MCNC: 7.6 MG/DL (ref 8.3–10.1)
CALCIUM SERPL-MCNC: 7.8 MG/DL (ref 8.3–10.1)
CHLORIDE SERPL-SCNC: 105 MMOL/L (ref 96–108)
CHLORIDE SERPL-SCNC: 108 MMOL/L (ref 96–108)
CLARITY UR: CLEAR
CO2 SERPL-SCNC: 26 MMOL/L (ref 21–32)
CO2 SERPL-SCNC: 27 MMOL/L (ref 21–32)
COLOR UR: YELLOW
CREAT SERPL-MCNC: 1.35 MG/DL (ref 0.6–1.3)
CREAT SERPL-MCNC: 1.4 MG/DL (ref 0.6–1.3)
EOSINOPHIL # BLD AUTO: 0.04 THOUSAND/ÂΜL (ref 0–0.61)
EOSINOPHIL # BLD AUTO: 0.04 THOUSAND/ÂΜL (ref 0–0.61)
EOSINOPHIL NFR BLD AUTO: 0 % (ref 0–6)
EOSINOPHIL NFR BLD AUTO: 0 % (ref 0–6)
ERYTHROCYTE [DISTWIDTH] IN BLOOD BY AUTOMATED COUNT: 17.5 % (ref 11.6–15.1)
ERYTHROCYTE [DISTWIDTH] IN BLOOD BY AUTOMATED COUNT: 17.5 % (ref 11.6–15.1)
FLUAV RNA RESP QL NAA+PROBE: NEGATIVE
FLUBV RNA RESP QL NAA+PROBE: NEGATIVE
GFR SERPL CREATININE-BSD FRML MDRD: 48 ML/MIN/1.73SQ M
GFR SERPL CREATININE-BSD FRML MDRD: 50 ML/MIN/1.73SQ M
GLUCOSE P FAST SERPL-MCNC: 202 MG/DL (ref 65–99)
GLUCOSE SERPL-MCNC: 164 MG/DL (ref 65–140)
GLUCOSE SERPL-MCNC: 200 MG/DL (ref 65–140)
GLUCOSE SERPL-MCNC: 202 MG/DL (ref 65–140)
GLUCOSE SERPL-MCNC: 239 MG/DL (ref 65–140)
GLUCOSE SERPL-MCNC: 261 MG/DL (ref 65–140)
GLUCOSE SERPL-MCNC: 265 MG/DL (ref 65–140)
GLUCOSE UR STRIP-MCNC: NEGATIVE MG/DL
HCT VFR BLD AUTO: 25.5 % (ref 36.5–49.3)
HCT VFR BLD AUTO: 29.9 % (ref 36.5–49.3)
HGB BLD-MCNC: 7.8 G/DL (ref 12–17)
HGB BLD-MCNC: 8.9 G/DL (ref 12–17)
HGB UR QL STRIP.AUTO: NEGATIVE
IMM GRANULOCYTES # BLD AUTO: 0.1 THOUSAND/UL (ref 0–0.2)
IMM GRANULOCYTES # BLD AUTO: 0.16 THOUSAND/UL (ref 0–0.2)
IMM GRANULOCYTES NFR BLD AUTO: 1 % (ref 0–2)
IMM GRANULOCYTES NFR BLD AUTO: 1 % (ref 0–2)
KETONES UR STRIP-MCNC: NEGATIVE MG/DL
LEUKOCYTE ESTERASE UR QL STRIP: NEGATIVE
LIPASE SERPL-CCNC: 598 U/L (ref 73–393)
LYMPHOCYTES # BLD AUTO: 0.65 THOUSANDS/ÂΜL (ref 0.6–4.47)
LYMPHOCYTES # BLD AUTO: 0.74 THOUSANDS/ÂΜL (ref 0.6–4.47)
LYMPHOCYTES NFR BLD AUTO: 6 % (ref 14–44)
LYMPHOCYTES NFR BLD AUTO: 8 % (ref 14–44)
MCH RBC QN AUTO: 25.8 PG (ref 26.8–34.3)
MCH RBC QN AUTO: 26.3 PG (ref 26.8–34.3)
MCHC RBC AUTO-ENTMCNC: 29.8 G/DL (ref 31.4–37.4)
MCHC RBC AUTO-ENTMCNC: 30.6 G/DL (ref 31.4–37.4)
MCV RBC AUTO: 86 FL (ref 82–98)
MCV RBC AUTO: 87 FL (ref 82–98)
MONOCYTES # BLD AUTO: 0.45 THOUSAND/ÂΜL (ref 0.17–1.22)
MONOCYTES # BLD AUTO: 0.53 THOUSAND/ÂΜL (ref 0.17–1.22)
MONOCYTES NFR BLD AUTO: 4 % (ref 4–12)
MONOCYTES NFR BLD AUTO: 6 % (ref 4–12)
NEUTROPHILS # BLD AUTO: 10.48 THOUSANDS/ÂΜL (ref 1.85–7.62)
NEUTROPHILS # BLD AUTO: 8.28 THOUSANDS/ÂΜL (ref 1.85–7.62)
NEUTS SEG NFR BLD AUTO: 85 % (ref 43–75)
NEUTS SEG NFR BLD AUTO: 89 % (ref 43–75)
NITRITE UR QL STRIP: NEGATIVE
NON-SQ EPI CELLS URNS QL MICRO: NORMAL /HPF
NRBC BLD AUTO-RTO: 0 /100 WBCS
NRBC BLD AUTO-RTO: 0 /100 WBCS
PH UR STRIP.AUTO: 5.5 [PH]
PLATELET # BLD AUTO: 371 THOUSANDS/UL (ref 149–390)
PLATELET # BLD AUTO: 424 THOUSANDS/UL (ref 149–390)
PMV BLD AUTO: 10.6 FL (ref 8.9–12.7)
PMV BLD AUTO: 10.7 FL (ref 8.9–12.7)
POTASSIUM SERPL-SCNC: 3.8 MMOL/L (ref 3.5–5.3)
POTASSIUM SERPL-SCNC: 3.9 MMOL/L (ref 3.5–5.3)
PROT SERPL-MCNC: 5.5 G/DL (ref 6.4–8.4)
PROT SERPL-MCNC: 6 G/DL (ref 6.4–8.4)
PROT UR STRIP-MCNC: ABNORMAL MG/DL
RBC # BLD AUTO: 2.97 MILLION/UL (ref 3.88–5.62)
RBC # BLD AUTO: 3.45 MILLION/UL (ref 3.88–5.62)
RBC #/AREA URNS AUTO: NORMAL /HPF
RSV RNA RESP QL NAA+PROBE: NEGATIVE
SARS-COV-2 RNA RESP QL NAA+PROBE: NEGATIVE
SODIUM SERPL-SCNC: 141 MMOL/L (ref 135–147)
SODIUM SERPL-SCNC: 142 MMOL/L (ref 135–147)
SP GR UR STRIP.AUTO: 1.03 (ref 1–1.03)
UROBILINOGEN UR STRIP-ACNC: <2 MG/DL
WBC # BLD AUTO: 11.79 THOUSAND/UL (ref 4.31–10.16)
WBC # BLD AUTO: 9.7 THOUSAND/UL (ref 4.31–10.16)
WBC #/AREA URNS AUTO: NORMAL /HPF

## 2022-12-31 RX ORDER — CLOPIDOGREL BISULFATE 75 MG/1
75 TABLET ORAL DAILY
Status: DISCONTINUED | OUTPATIENT
Start: 2022-12-31 | End: 2023-01-04

## 2022-12-31 RX ORDER — CARVEDILOL 3.12 MG/1
3.12 TABLET ORAL 2 TIMES DAILY WITH MEALS
COMMUNITY

## 2022-12-31 RX ORDER — HYDROMORPHONE HCL IN WATER/PF 6 MG/30 ML
0.2 PATIENT CONTROLLED ANALGESIA SYRINGE INTRAVENOUS ONCE
Status: COMPLETED | OUTPATIENT
Start: 2022-12-31 | End: 2022-12-31

## 2022-12-31 RX ORDER — AMLODIPINE BESYLATE 5 MG/1
5 TABLET ORAL DAILY
Status: DISCONTINUED | OUTPATIENT
Start: 2022-12-31 | End: 2023-01-07 | Stop reason: HOSPADM

## 2022-12-31 RX ORDER — PANTOPRAZOLE SODIUM 40 MG/1
40 TABLET, DELAYED RELEASE ORAL DAILY
COMMUNITY

## 2022-12-31 RX ORDER — PANTOPRAZOLE SODIUM 40 MG/1
40 TABLET, DELAYED RELEASE ORAL DAILY
Status: DISCONTINUED | OUTPATIENT
Start: 2022-12-31 | End: 2023-01-07 | Stop reason: HOSPADM

## 2022-12-31 RX ORDER — INSULIN LISPRO 100 [IU]/ML
1-5 INJECTION, SOLUTION INTRAVENOUS; SUBCUTANEOUS
Status: DISCONTINUED | OUTPATIENT
Start: 2022-12-31 | End: 2023-01-07 | Stop reason: HOSPADM

## 2022-12-31 RX ORDER — HEPARIN SODIUM 5000 [USP'U]/ML
5000 INJECTION, SOLUTION INTRAVENOUS; SUBCUTANEOUS EVERY 8 HOURS SCHEDULED
Status: DISCONTINUED | OUTPATIENT
Start: 2022-12-31 | End: 2023-01-07 | Stop reason: HOSPADM

## 2022-12-31 RX ORDER — DONEPEZIL HYDROCHLORIDE 5 MG/1
5 TABLET, FILM COATED ORAL
COMMUNITY

## 2022-12-31 RX ORDER — CARVEDILOL 3.12 MG/1
3.12 TABLET ORAL 2 TIMES DAILY WITH MEALS
Status: DISCONTINUED | OUTPATIENT
Start: 2022-12-31 | End: 2023-01-07 | Stop reason: HOSPADM

## 2022-12-31 RX ORDER — ONDANSETRON 2 MG/ML
4 INJECTION INTRAMUSCULAR; INTRAVENOUS ONCE
Status: COMPLETED | OUTPATIENT
Start: 2022-12-31 | End: 2022-12-31

## 2022-12-31 RX ORDER — ASPIRIN 81 MG/1
81 TABLET ORAL DAILY
Status: DISCONTINUED | OUTPATIENT
Start: 2022-12-31 | End: 2023-01-07 | Stop reason: HOSPADM

## 2022-12-31 RX ORDER — ATORVASTATIN CALCIUM 20 MG/1
20 TABLET, FILM COATED ORAL EVERY EVENING
Status: DISCONTINUED | OUTPATIENT
Start: 2022-12-31 | End: 2023-01-07 | Stop reason: HOSPADM

## 2022-12-31 RX ORDER — ACETAMINOPHEN 325 MG/1
650 TABLET ORAL EVERY 6 HOURS PRN
Status: DISCONTINUED | OUTPATIENT
Start: 2022-12-31 | End: 2023-01-07 | Stop reason: HOSPADM

## 2022-12-31 RX ORDER — AMLODIPINE BESYLATE 5 MG/1
5 TABLET ORAL DAILY
COMMUNITY

## 2022-12-31 RX ORDER — DONEPEZIL HYDROCHLORIDE 5 MG/1
5 TABLET, FILM COATED ORAL
Status: DISCONTINUED | OUTPATIENT
Start: 2022-12-31 | End: 2023-01-07 | Stop reason: HOSPADM

## 2022-12-31 RX ADMIN — INSULIN LISPRO 2 UNITS: 100 INJECTION, SOLUTION INTRAVENOUS; SUBCUTANEOUS at 23:03

## 2022-12-31 RX ADMIN — SODIUM CHLORIDE 1000 ML: 0.9 INJECTION, SOLUTION INTRAVENOUS at 02:25

## 2022-12-31 RX ADMIN — CEFEPIME 2000 MG: 2 INJECTION, POWDER, FOR SOLUTION INTRAVENOUS at 15:44

## 2022-12-31 RX ADMIN — AMLODIPINE BESYLATE 5 MG: 5 TABLET ORAL at 08:40

## 2022-12-31 RX ADMIN — CARVEDILOL 3.12 MG: 3.12 TABLET, FILM COATED ORAL at 08:40

## 2022-12-31 RX ADMIN — HEPARIN SODIUM 5000 UNITS: 5000 INJECTION INTRAVENOUS; SUBCUTANEOUS at 06:29

## 2022-12-31 RX ADMIN — HEPARIN SODIUM 5000 UNITS: 5000 INJECTION INTRAVENOUS; SUBCUTANEOUS at 23:03

## 2022-12-31 RX ADMIN — INSULIN LISPRO 1 UNITS: 100 INJECTION, SOLUTION INTRAVENOUS; SUBCUTANEOUS at 06:33

## 2022-12-31 RX ADMIN — HYDROMORPHONE HYDROCHLORIDE 0.2 MG: 0.2 INJECTION, SOLUTION INTRAMUSCULAR; INTRAVENOUS; SUBCUTANEOUS at 00:49

## 2022-12-31 RX ADMIN — PANTOPRAZOLE SODIUM 40 MG: 40 TABLET, DELAYED RELEASE ORAL at 08:40

## 2022-12-31 RX ADMIN — CLOPIDOGREL BISULFATE 75 MG: 75 TABLET ORAL at 08:40

## 2022-12-31 RX ADMIN — CEFEPIME 2000 MG: 2 INJECTION, POWDER, FOR SOLUTION INTRAVENOUS at 04:44

## 2022-12-31 RX ADMIN — ONDANSETRON 4 MG: 2 INJECTION INTRAMUSCULAR; INTRAVENOUS at 00:49

## 2022-12-31 RX ADMIN — IOHEXOL 100 ML: 350 INJECTION, SOLUTION INTRAVENOUS at 02:09

## 2022-12-31 RX ADMIN — DONEPEZIL HYDROCHLORIDE 5 MG: 5 TABLET ORAL at 23:03

## 2022-12-31 RX ADMIN — ATORVASTATIN CALCIUM 20 MG: 20 TABLET, FILM COATED ORAL at 18:35

## 2022-12-31 RX ADMIN — ASPIRIN 81 MG: 81 TABLET ORAL at 08:40

## 2022-12-31 RX ADMIN — INSULIN LISPRO 2 UNITS: 100 INJECTION, SOLUTION INTRAVENOUS; SUBCUTANEOUS at 18:40

## 2022-12-31 RX ADMIN — CARVEDILOL 3.12 MG: 3.12 TABLET, FILM COATED ORAL at 18:35

## 2022-12-31 RX ADMIN — INSULIN LISPRO 1 UNITS: 100 INJECTION, SOLUTION INTRAVENOUS; SUBCUTANEOUS at 11:03

## 2022-12-31 NOTE — ASSESSMENT & PLAN NOTE
Lab Results   Component Value Date    EGFR 48 12/31/2022    EGFR 63 04/04/2018    CREATININE 1 40 (H) 12/31/2022    CREATININE 1 16 04/04/2018     · Creatinine 1 40; recent baseline difficult to establish  · Avoid nephrotoxic agents  · AM BMP

## 2022-12-31 NOTE — H&P
[address]  H&P- Micaela Alfaro 1945, 69 y o  male MRN: [id number]  Unit/Bed#: ED 29 Encounter: [id number]  Primary Care Provider: No primary care provider on file  Date and time admitted to hospital: 12/31/2022 12:06 AM    * Cholecystostomy drain infection [facility])  Assessment & Plan  Accurate ROS unable to be obtained from patient due to dementia  However he is currently without pain or other complaint  Per ED provider patient did have some complaints of dull generalized abdominal pain, sent in from outpatient care facility who reports patient has had increasing abdominal pain, distention, and decreased drainage from his percutaneous cholecystostomy tube      /77 (BP Location: Right arm)   Pulse 79   Temp 98 1 °F (36 7 °C) (Oral)   Resp 18   SpO2 97%     · Had percutaneous cholecystostomy drain placed 12/23 at [address] [place] [address]  · Outpatient care facility thought patient had increasing abd distention, pain, and decreased drain output  · Patient currently denies any abd pain/complaint however has prior stroke and possible dementia hx   · Abdomen is soft and with mild RUQ tenderness to deep palpation currently  · CT showing some pericholecystic fluid/fat stranding  · Not meeting SEPSIS criteria currently  · ED gave IV cefepime; continue overnight  · Trend WBC; f/u w/ pending infectious labs   · IR consulted to interrogate perc sylvain drain      Dementia ([facility] [address] )  Assessment & Plan  · Able to answer questions and follow commands  · However does appear mildly confused as he does not remember having surgery recently   · Continue home aricept     CKD (chronic kidney disease)  Assessment & Plan  Lab Results   Component Value Date    EGFR 48 12/31/2022    EGFR 63 04/04/2018    CREATININE 1 40 (H) 12/31/2022    CREATININE 1 16 04/04/2018     · Creatinine 1 40; recent baseline difficult to establish  · Avoid nephrotoxic agents  · AM BMP     A-fib ([facility] [address] )  Assessment & Plan  · HR appears rate controlled while in ED  · Continue home carvedilol  · Not currently on outpatient anticoagulation  · Monitor HR     Type 2 diabetes mellitus without complication (HCC)  Assessment & Plan  ·   · Hold home PO antihyperglycemics  · Correctional SSI   · Hypoglycemia protocol  · Diabetic diet     Hyperlipidemia  Assessment & Plan  · Continue home statin    Benign essential hypertension  Assessment & Plan  · /77  · Continue home amlodipine and carvedilol  · Monitor BP Per unit protocol     VTE Pharmacologic Prophylaxis: VTE Score: 3 Moderate Risk (Score 3-4) - Pharmacological DVT Prophylaxis Ordered: heparin  Code Status: Level 1 - Full Code   Discussion with family: update in AM      Anticipated Length of Stay: Patient will be admitted on an observation basis with an anticipated length of stay of less than 2 midnights secondary to percutaneous cholecystostomy drain infection/malfunction  Total Time for Visit, including Counseling / Coordination of Care: 45 minutes Greater than 50% of this total time spent on direct patient counseling and coordination of care  Chief Complaint: nursing home reports decreased per sylvain drain drainage and increasing abd pain/distention    History of Present Illness: Angella Sapp is a 69 y o  male with a PMH of prior stroke, suspected dementia, afib, CKD, T2DM, HLD, and HTN who presents with perc sylvain drain malfunction and increasing abd pain/distention per nursing home  Accurate ROS unable to be obtained from patient due to dementia  However he is currently without pain or other complaint  Per ED provider patient did have some complaints of dull generalized abdominal pain, sent in from outpatient care facility who reports patient has had increasing abdominal pain, distention, and decreased drainage from his percutaneous cholecystostomy tube      Review of Systems:  Review of Systems   Unable to perform ROS: Dementia       Past Medical and Surgical History:   Past Medical History:   Diagnosis Date   • Diabetes mellitus ([facility] [address] )    • Hypertension    • Phlebitis    • Stroke [facility])        Past Surgical History:   Procedure Laterality Date   • ADENOIDECTOMY     • CHOLECYSTECTOMY         Meds/Allergies:  Prior to Admission medications    Medication Sig Start Date End Date Taking? Authorizing Provider   amLODIPine (NORVASC) 5 mg tablet Take 5 mg by mouth daily   Yes Historical Provider, MD   carvedilol (COREG) 3 125 mg tablet Take 3 125 mg by mouth 2 (two) times a day with meals   Yes Historical Provider, MD   donepezil (ARICEPT) 5 mg tablet Take 5 mg by mouth daily at bedtime   Yes Historical Provider, MD   pantoprazole (PROTONIX) 40 mg tablet Take 40 mg by mouth daily   Yes Historical Provider, MD   aspirin (ECOTRIN LOW STRENGTH) 81 mg EC tablet Take 81 mg by mouth daily    Historical Provider, MD   ATORVASTATIN CALCIUM PO Take 20 mg by mouth every evening    Historical Provider, MD   CLOPIDOGREL BISULFATE PO Take 75 mg by mouth in the morning    Historical Provider, MD   METFORMIN HCL PO Take by mouth    Historical Provider, MD   SITagliptin Phosphate (JANUVIA PO) Take by mouth    Historical Provider, MD   CARVEDILOL PO Take by mouth  12/31/22  Historical Provider, MD   ENALAPRIL MALEATE PO Take by mouth  12/31/22  Historical Provider, MD   HYDROCHLOROTHIAZIDE PO Take by mouth  12/31/22  Historical Provider, MD     I have reviewed home medications using recent Epic encounter      Allergies: No Known Allergies    Social History:  Marital Status: /Civil Union   Occupation: N/A  Patient Pre-hospital Living Situation: [facility]  Patient Pre-hospital Level of Mobility: unable to be assessed at time of evaluation  Patient Pre-hospital Diet Restrictions: Unable to be assessed at this time  Substance Use History:   Social History     Substance and Sexual Activity   Alcohol Use No     Social History     Tobacco Use   Smoking Status Never   Smokeless Tobacco Never Social History     Substance and Sexual Activity   Drug Use No       Family History:  No family history on file  Physical Exam:     Vitals:   Blood Pressure: 158/77 (12/31/22 0215)  Pulse: 79 (12/31/22 0215)  Temperature: 98 1 °F (36 7 °C) (12/31/22 0011)  Temp Source: Oral (12/31/22 0011)  Respirations: 18 (12/31/22 0215)  SpO2: 97 % (12/31/22 0215)    Physical Exam  Vitals and nursing note reviewed  Constitutional:       General: He is not in acute distress  Appearance: Normal appearance  He is ill-appearing (chronic )  HENT:      Head: Normocephalic and atraumatic  Right Ear: External ear normal       Left Ear: External ear normal       Nose: Nose normal       Mouth/Throat:      Mouth: Mucous membranes are moist    Eyes:      Pupils: Pupils are equal, round, and reactive to light  Cardiovascular:      Rate and Rhythm: Normal rate and regular rhythm  Pulses: Normal pulses  Heart sounds: Normal heart sounds  No murmur heard  Pulmonary:      Effort: Pulmonary effort is normal  No respiratory distress  Breath sounds: Normal breath sounds  No wheezing or rales  Chest:      Chest wall: No tenderness  Abdominal:      General: Bowel sounds are normal  There is no distension  Palpations: Abdomen is soft  There is no mass  Tenderness: There is abdominal tenderness (RUQ to deep palpation)  There is no guarding  Comments: +Perc sylvain drain in place    Musculoskeletal:         General: No swelling or tenderness  Cervical back: Normal range of motion and neck supple  No tenderness  Right lower leg: No edema  Left lower leg: No edema  Skin:     General: Skin is warm and dry  Capillary Refill: Capillary refill takes less than 2 seconds  Findings: No lesion or rash  Neurological:      General: No focal deficit present  Mental Status: He is alert     Psychiatric:         Mood and Affect: Mood normal           Additional Data:     Lab Results:  Results from last 7 days   Lab Units 12/31/22  0047   WBC Thousand/uL 11 79*   HEMOGLOBIN g/dL 8 9*   HEMATOCRIT % 29 9*   PLATELETS Thousands/uL 424*   NEUTROS PCT % 89*   LYMPHS PCT % 6*   MONOS PCT % 4   EOS PCT % 0     Results from last 7 days   Lab Units 12/31/22  0047   SODIUM mmol/L 141   POTASSIUM mmol/L 3 9   CHLORIDE mmol/L 105   CO2 mmol/L 26   BUN mg/dL 39*   CREATININE mg/dL 1 40*   ANION GAP mmol/L 10   CALCIUM mg/dL 7 8*   ALBUMIN g/dL 1 6*   TOTAL BILIRUBIN mg/dL 0 59   ALK PHOS U/L 460*   ALT U/L 13   AST U/L 17   GLUCOSE RANDOM mg/dL 239*                       Lines/Drains:  Invasive Devices     Peripheral Intravenous Line  Duration           Peripheral IV 12/31/22 Right Antecubital <1 day                    Imaging: Reviewed radiology reports from this admission including: abdominal/pelvic CT  CT abdomen pelvis with contrast   Final Result by Darvin Cabrera MD (12/31 0250)      1  Percutaneous cholecystostomy catheter seen with its pigtail in the collapsed gallbladder  There is cholelithiasis  There is pericholecystic fluid/fat stranding which is nonspecific in setting of small to moderate ascites  2   Mild diffuse thickening and hyperenhancement of the rectosigmoid colon which may be due to nonspecific colitis/proctitis  Gaseous distention of the transverse and descending colon  3   Mild diffuse thickening of the stomach wall may be due to gastritis  4   Small bilateral pleural effusions  5   Mild thickening of the urinary bladder wall which may be due to chronic bladder outlet obstruction, correlate with urinalysis to exclude cystitis  Workstation performed: [id number]             EKG and Other Studies Reviewed on Admission:   · EKG: No EKG obtained  ** Please Note: This note has been constructed using a voice recognition system   **

## 2022-12-31 NOTE — ASSESSMENT & PLAN NOTE
Accurate ROS unable to be obtained from patient due to dementia  However he is currently without pain or other complaint  Per ED provider patient did have some complaints of dull generalized abdominal pain, sent in from outpatient care facility who reports patient has had increasing abdominal pain, distention, and decreased drainage from his percutaneous cholecystostomy tube      /77 (BP Location: Right arm)   Pulse 79   Temp 98 1 °F (36 7 °C) (Oral)   Resp 18   SpO2 97%     · Had percutaneous cholecystostomy drain placed 12/23 at [address] [place] [address]  · Outpatient care facility thought patient had increasing abd distention, pain, and decreased drain output  · Patient currently denies any abd pain/complaint however has prior stroke and possible dementia hx   · Abdomen is soft and with mild RUQ tenderness to deep palpation currently  · CT showing some pericholecystic fluid/fat stranding  · Not meeting SEPSIS criteria currently  · ED gave IV cefepime; continue overnight  · Trend WBC; f/u w/ pending infectious labs   · IR consulted to interrogate perc sylvain drain

## 2022-12-31 NOTE — ED PROVIDER NOTES
Pt Name: Claudia Franks  MRN: [id number]  [place] 1945  Age/Sex: 69 y o  male  Date of evaluation: 12/31/2022  PCP: No primary care provider on file  CHIEF COMPLAINT    Chief Complaint   Patient presents with   • Abdominal Pain     Pt arrives EMS from rehab facility, pt dx with gallstones and biliary tube placed  Pt c/o abdominal pain  Per EMS facility stated pts abdomen more distended today         HPI    69 y o  male presenting with abdominal pain  Patient was sent to the ER from rehab facility, is unsure why  He currently complains of abdominal pain which is dull, moderate intensity, throughout the abdomen but worse on the right side, worse with movement or pressing on the area and better at rest   He denies any other symptoms  Per paperwork from facility, patient has had increased abdominal pain and abdominal distention, concern for obstruction of percutaneous gallbladder drain  When asked, patient expresses surprise that there is a drain in place in his abdomen  HPI      Past Medical and Surgical History    Past Medical History:   Diagnosis Date   • Diabetes mellitus ([facility] [address] )    • Hypertension    • Phlebitis    • Stroke [facility])        Past Surgical History:   Procedure Laterality Date   • ADENOIDECTOMY     • CHOLECYSTECTOMY         No family history on file  Social History     Tobacco Use   • Smoking status: Never   • Smokeless tobacco: Never   Substance Use Topics   • Alcohol use: No   • Drug use: No           Allergies    No Known Allergies    Home Medications    Prior to Admission medications    Medication Sig Start Date End Date Taking?  Authorizing Provider   aspirin (ECOTRIN LOW STRENGTH) 81 mg EC tablet Take 81 mg by mouth daily    Historical Provider, MD   ATORVASTATIN CALCIUM PO Take by mouth    Historical Provider, MD   CARVEDILOL PO Take by mouth    Historical Provider, MD   CLOPIDOGREL BISULFATE PO Take by mouth    Historical Provider, MD   ENALAPRIL MALEATE PO Take by mouth Historical Provider, MD   HYDROCHLOROTHIAZIDE PO Take by mouth    Historical Provider, MD   METFORMIN HCL PO Take by mouth    Historical Provider, MD   SITagliptin Phosphate (JANUVIA PO) Take by mouth    Historical Provider, MD           Review of Systems    Review of Systems   Unable to perform ROS: Dementia           All other systems reviewed and negative  Physical Exam      ED Triage Vitals   Temperature Pulse Respirations Blood Pressure SpO2   12/31/22 0011 12/31/22 0008 12/31/22 0008 12/31/22 0008 12/31/22 0008   98 1 °F (36 7 °C) 94 18 (!) 190/100 100 %      Temp Source Heart Rate Source Patient Position - Orthostatic VS BP Location FiO2 (%)   12/31/22 0011 12/31/22 0008 12/31/22 0008 12/31/22 0008 --   Oral Monitor Lying Right arm       Pain Score       --                      Physical Exam  Vitals and nursing note reviewed  Constitutional:       General: He is not in acute distress  Appearance: He is well-developed  He is not ill-appearing, toxic-appearing or diaphoretic  HENT:      Head: Normocephalic and atraumatic  Right Ear: External ear normal       Left Ear: External ear normal    Eyes:      Conjunctiva/sclera: Conjunctivae normal       Pupils: Pupils are equal, round, and reactive to light  Neck:      Trachea: No tracheal deviation  Cardiovascular:      Rate and Rhythm: Normal rate and regular rhythm  Heart sounds: Normal heart sounds  No murmur heard  Pulmonary:      Effort: Pulmonary effort is normal  No respiratory distress  Breath sounds: Normal breath sounds  No stridor  No wheezing or rales  Abdominal:      General: There is no distension  Palpations: Abdomen is soft  Tenderness: There is abdominal tenderness  There is no guarding or rebound  Comments: Patient tender to palpation the right upper quadrant of the abdomen, no rebound or guarding, abdominal exam overall benign    Percutaneous drain noted in the right abdomen, skin clean dry and intact around the drain, no purulent drainage  Small amount of brown bilious appearing drainage noted in the bag at end of drain  Musculoskeletal:         General: No deformity  Normal range of motion  Cervical back: Normal range of motion and neck supple  Skin:     General: Skin is warm and dry  Capillary Refill: Capillary refill takes less than 2 seconds  Findings: No rash  Neurological:      Mental Status: He is alert  Comments: Patient oriented to self but not time place location or situation  Psychiatric:         Behavior: Behavior normal          Thought Content: Thought content normal          Judgment: Judgment normal               Diagnostic Results      Labs:    Results Reviewed     Procedure Component Value Units Date/Time    UA (URINE) with reflex to Scope [[id number]] Collected: 12/31/22 0326    Lab Status:  In process Specimen: Urine, Other Updated: 12/31/22 0338    Blood culture #1 [[id number]]     Lab Status: No result Specimen: Blood     Blood culture #2 [[id number]]     Lab Status: No result Specimen: Blood     CBC and differential [[id number]]  (Abnormal) Collected: 12/31/22 0047    Lab Status: Final result Specimen: Blood from Arm, Right Updated: 12/31/22 0154     WBC 11 79 Thousand/uL      RBC 3 45 Million/uL      Hemoglobin 8 9 g/dL      Hematocrit 29 9 %      MCV 87 fL      MCH 25 8 pg      MCHC 29 8 g/dL      RDW 17 5 %      MPV 10 6 fL      Platelets 184 Thousands/uL      nRBC 0 /100 WBCs      Neutrophils Relative 89 %      Immat GRANS % 1 %      Lymphocytes Relative 6 %      Monocytes Relative 4 %      Eosinophils Relative 0 %      Basophils Relative 0 %      Neutrophils Absolute 10 48 Thousands/µL      Immature Grans Absolute 0 16 Thousand/uL      Lymphocytes Absolute 0 65 Thousands/µL      Monocytes Absolute 0 45 Thousand/µL      Eosinophils Absolute 0 04 Thousand/µL      Basophils Absolute 0 01 Thousands/µL     FLU/RSV/COVID - if FLU/RSV clinically relevant [[id number]] (Normal) Collected: 12/31/22 0047    Lab Status: Final result Specimen: Nares from Nose Updated: 12/31/22 0152     SARS-CoV-2 Negative     INFLUENZA A PCR Negative     INFLUENZA B PCR Negative     RSV PCR Negative    Narrative:      FOR PEDIATRIC PATIENTS - copy/paste COVID Guidelines URL to browser: [email or web address]    SARS-CoV-2 assay is a Nucleic Acid Amplification assay intended for the  qualitative detection of nucleic acid from SARS-CoV-2 in nasopharyngeal  swabs  Results are for the presumptive identification of SARS-CoV-2 RNA  Positive results are indicative of infection with SARS-CoV-2, the virus  causing COVID-19, but do not rule out bacterial infection or co-infection  with other viruses  Laboratories within the [place] and its  territories are required to report all positive results to the appropriate  public health authorities  Negative results do not preclude SARS-CoV-2  infection and should not be used as the sole basis for treatment or other  patient management decisions  Negative results must be combined with  clinical observations, patient history, and epidemiological information  This test has not been FDA cleared or approved  This test has been authorized by FDA under an Emergency Use Authorization  (EUA)  This test is only authorized for the duration of time the  declaration that circumstances exist justifying the authorization of the  emergency use of an in vitro diagnostic tests for detection of SARS-CoV-2  virus and/or diagnosis of COVID-19 infection under section 564(b)(1) of  the Act, 21 U  S C  682CQE-9(L)(5), unless the authorization is terminated  or revoked sooner  The test has been validated but independent review by FDA  and CLIA is pending  Test performed using [employer] GeneXpert: This RT-PCR assay targets N2,  a region unique to SARS-CoV-2   A conserved region in the E-gene was chosen  for pan-Sarbecovirus detection which includes SARS-CoV-2  According to CMS-2020-01-R, this platform meets the definition of high-throughput technology  Comprehensive metabolic panel [[id number]]  (Abnormal) Collected: 12/31/22 0047    Lab Status: Final result Specimen: Blood from Arm, Right Updated: 12/31/22 0148     Sodium 141 mmol/L      Potassium 3 9 mmol/L      Chloride 105 mmol/L      CO2 26 mmol/L      ANION GAP 10 mmol/L      BUN 39 mg/dL      Creatinine 1 40 mg/dL      Glucose 239 mg/dL      Calcium 7 8 mg/dL      Corrected Calcium 9 7 mg/dL      AST 17 U/L      ALT 13 U/L      Alkaline Phosphatase 460 U/L      Total Protein 6 0 g/dL      Albumin 1 6 g/dL      Total Bilirubin 0 59 mg/dL      eGFR 48 ml/min/1 73sq m     Narrative:      [place] guidelines for Chronic Kidney Disease (CKD):   •  Stage 1 with normal or high GFR (GFR > 90 mL/min/1 73 square meters)  •  Stage 2 Mild CKD (GFR = 60-89 mL/min/1 73 square meters)  •  Stage 3A Moderate CKD (GFR = 45-59 mL/min/1 73 square meters)  •  Stage 3B Moderate CKD (GFR = 30-44 mL/min/1 73 square meters)  •  Stage 4 Severe CKD (GFR = 15-29 mL/min/1 73 square meters)  •  Stage 5 End Stage CKD (GFR <15 mL/min/1 73 square meters)  Note: GFR calculation is accurate only with a steady state creatinine    Lipase [[id number]]  (Abnormal) Collected: 12/31/22 0047    Lab Status: Final result Specimen: Blood from Arm, Right Updated: 12/31/22 0148     Lipase 598 u/L           All labs reviewed and utilized in the medical decision making process    Radiology:    CT abdomen pelvis with contrast   Final Result      1  Percutaneous cholecystostomy catheter seen with its pigtail in the collapsed gallbladder  There is cholelithiasis  There is pericholecystic fluid/fat stranding which is nonspecific in setting of small to moderate ascites  2   Mild diffuse thickening and hyperenhancement of the rectosigmoid colon which may be due to nonspecific colitis/proctitis  Gaseous distention of the transverse and descending colon  3   Mild diffuse thickening of the stomach wall may be due to gastritis  4   Small bilateral pleural effusions  5   Mild thickening of the urinary bladder wall which may be due to chronic bladder outlet obstruction, correlate with urinalysis to exclude cystitis  Workstation performed: [id number]             All radiology studies independently viewed by me and interpreted by the radiologist     Procedure    Procedures        ED Course of Care and Re-Assessments      On review of EMR, patient was diagnosed with COVID 10 days ago, also had acute cholecystitis which was treated with a percutaneous drain  Labs and imaging as above, some concern for possible obstruction of biliary drain, discussed possible transfer to other hospital where drain was placed with the patient but patient does not appear to have capacity to make that decision at this time, cannot remember the procedure or the fact that he has a drain in his abdomen  Plan formed for admission to internal medicine for IR evaluation with drain    Medications   cefepime (MAXIPIME) 2 g/50 mL dextrose IVPB (has no administration in time range)   amLODIPine (NORVASC) tablet 5 mg (has no administration in time range)   aspirin (ECOTRIN LOW STRENGTH) EC tablet 81 mg (has no administration in time range)   atorvastatin (LIPITOR) tablet 20 mg (has no administration in time range)   carvedilol (COREG) tablet 3 125 mg (has no administration in time range)   clopidogrel (PLAVIX) tablet 75 mg (has no administration in time range)   donepezil (ARICEPT) tablet 5 mg (has no administration in time range)   pantoprazole (PROTONIX) EC tablet 40 mg (has no administration in time range)   acetaminophen (TYLENOL) tablet 650 mg (has no administration in time range)   heparin (porcine) subcutaneous injection 5,000 Units (has no administration in time range)   insulin lispro (HumaLOG) 100 units/mL subcutaneous injection 1-5 Units (has no administration in time range)   cefepime (MAXIPIME) 2 g/50 mL dextrose IVPB (has no administration in time range)   HYDROmorphone HCl (DILAUDID) injection 0 2 mg (0 2 mg Intravenous Given 12/31/22 0049)   ondansetron (ZOFRAN) injection 4 mg (4 mg Intravenous Given 12/31/22 0049)   iohexol (OMNIPAQUE) 350 MG/ML injection (SINGLE-DOSE) 100 mL (100 mL Intravenous Given 12/31/22 0209)   sodium chloride 0 9 % bolus 1,000 mL (1,000 mL Intravenous New Bag 12/31/22 0225)           FINAL IMPRESSION    Final diagnoses:   Abdominal pain   Thickening of wall of gallbladder with pericholecystic fluid   Cholelithiasis   Leukocytosis   Anemia   Dehydration   Renal insufficiency         DISPOSITION/PLAN    Presentation as above with abdominal pain in the setting of recent percutaneous gallbladder drain  Vital signs reassuring, examination remarkable for tenderness to palpation of the abdomen  Labs and CT remarkable for abnormalities as above  cultures obtained, started on broad-spectrum antibiotics, admitted to internal medicine to facilitate interrogation of drain  Hemodynamically stable and comfortable at time of admit    Time reflects when diagnosis was documented in both MDM as applicable and the Disposition within this note     Time User Action Codes Description Comment    12/31/2022  3:34 AM Sherhenryn Barney T Add [R10 9] Abdominal pain     12/31/2022  3:34 AM Sheralyn Hu T Add [K82 8] Thickening of wall of gallbladder with pericholecystic fluid     12/31/2022  3:40 AM Marco Bird Add [R18 8] Ascites     12/31/2022  3:43 AM Sheralyn Hu T Add [L76 82] Postoperative complication of skin involving drainage from surgical wound     12/31/2022  3:43 AM Roddy Ronald [L76 82] Postoperative complication of skin involving drainage from surgical wound     12/31/2022  3:45 AM Heather Blush Add [K80 20] Cholelith     12/31/2022  3:45 AM Sheralyn Hu T Add [K80 20] Cholelithiasis     12/31/2022  3:45 AM Sherlyn Heal T Add [D72 829] Leukocytosis     12/31/2022  3:45 AM [place] Heal T Remove [K80 20] Cholelith     12/31/2022  3:47 AM Isra Kim Add [T85 79XA] Infection of cholecystostomy drain, initial encounter ([facility] [address] )     12/31/2022  3:51 AM Sherlyn Heal T Add [D64 9] Anemia     12/31/2022  3:51 AM Sherlyn Heal T Add [E86 0] Dehydration     12/31/2022  3:51 AM Ciara Palms Add [N28 9] Renal insufficiency       ED Disposition     ED Disposition   Admit    Condition   Stable    Date/Time   Sat Dec 31, 2022  3:34 AM    Comment   Case was discussed with ARELY and the patient's admission status was agreed to be Admission Status: observation status to the service of Dr Tatyana Woodall   Follow-up Information    None           PATIENT REFERRED TO:    No follow-up provider specified  DISCHARGE MEDICATIONS:    Patient's Medications   Discharge Prescriptions    No medications on file       No discharge procedures on file  Jeremy Kearney MD    Portions of the record may have been created with voice recognition software  Occasional wrong word or "sound alike" substitutions may have occurred due to the inherent limitations of voice recognition software    Please read the chart carefully and recognize, using context, where substitutions have occurred     Jeremy Kearney MD  12/31/22 5424

## 2022-12-31 NOTE — ED NOTES
Pt arrived in soiled (bowel movement)  adult brief, Pt changed and cleaned up  Nilsa So  12/31/22 0018

## 2022-12-31 NOTE — ASSESSMENT & PLAN NOTE
· Able to answer questions and follow commands  · However does appear mildly confused as he does not remember having surgery recently   · Continue home aricept

## 2022-12-31 NOTE — ED NOTES
ARELY made aware that daughter is requesting to speak to provider about plan of care        Jayme Alcaraz, GRAHAM  12/31/22 2983 Pt left with transport to dialysis. No tele box due to pt being medical.

## 2022-12-31 NOTE — ASSESSMENT & PLAN NOTE
· HR appears rate controlled while in ED  · Continue home carvedilol  · Not currently on outpatient anticoagulation  · Monitor HR

## 2022-12-31 NOTE — ASSESSMENT & PLAN NOTE
·   · Hold home PO antihyperglycemics  · Correctional SSI   · Hypoglycemia protocol  · Diabetic diet

## 2023-01-01 LAB
GLUCOSE SERPL-MCNC: 155 MG/DL (ref 65–140)
GLUCOSE SERPL-MCNC: 173 MG/DL (ref 65–140)
GLUCOSE SERPL-MCNC: 209 MG/DL (ref 65–140)
GLUCOSE SERPL-MCNC: 264 MG/DL (ref 65–140)

## 2023-01-01 RX ADMIN — PANTOPRAZOLE SODIUM 40 MG: 40 TABLET, DELAYED RELEASE ORAL at 08:55

## 2023-01-01 RX ADMIN — AMLODIPINE BESYLATE 5 MG: 5 TABLET ORAL at 08:55

## 2023-01-01 RX ADMIN — ATORVASTATIN CALCIUM 20 MG: 20 TABLET, FILM COATED ORAL at 17:51

## 2023-01-01 RX ADMIN — INSULIN LISPRO 1 UNITS: 100 INJECTION, SOLUTION INTRAVENOUS; SUBCUTANEOUS at 08:57

## 2023-01-01 RX ADMIN — CLOPIDOGREL BISULFATE 75 MG: 75 TABLET ORAL at 08:55

## 2023-01-01 RX ADMIN — INSULIN LISPRO 1 UNITS: 100 INJECTION, SOLUTION INTRAVENOUS; SUBCUTANEOUS at 22:00

## 2023-01-01 RX ADMIN — ACETAMINOPHEN 650 MG: 325 TABLET, FILM COATED ORAL at 16:46

## 2023-01-01 RX ADMIN — INSULIN LISPRO 1 UNITS: 100 INJECTION, SOLUTION INTRAVENOUS; SUBCUTANEOUS at 11:30

## 2023-01-01 RX ADMIN — ACETAMINOPHEN 650 MG: 325 TABLET, FILM COATED ORAL at 08:55

## 2023-01-01 RX ADMIN — INSULIN LISPRO 2 UNITS: 100 INJECTION, SOLUTION INTRAVENOUS; SUBCUTANEOUS at 16:47

## 2023-01-01 RX ADMIN — CEFEPIME 2000 MG: 2 INJECTION, POWDER, FOR SOLUTION INTRAVENOUS at 15:47

## 2023-01-01 RX ADMIN — CARVEDILOL 3.12 MG: 3.12 TABLET, FILM COATED ORAL at 16:46

## 2023-01-01 RX ADMIN — HEPARIN SODIUM 5000 UNITS: 5000 INJECTION INTRAVENOUS; SUBCUTANEOUS at 21:54

## 2023-01-01 RX ADMIN — CARVEDILOL 3.12 MG: 3.12 TABLET, FILM COATED ORAL at 08:55

## 2023-01-01 RX ADMIN — ASPIRIN 81 MG: 81 TABLET ORAL at 08:55

## 2023-01-01 RX ADMIN — CEFEPIME 2000 MG: 2 INJECTION, POWDER, FOR SOLUTION INTRAVENOUS at 03:26

## 2023-01-01 RX ADMIN — HEPARIN SODIUM 5000 UNITS: 5000 INJECTION INTRAVENOUS; SUBCUTANEOUS at 16:00

## 2023-01-01 RX ADMIN — DONEPEZIL HYDROCHLORIDE 5 MG: 5 TABLET ORAL at 21:55

## 2023-01-01 RX ADMIN — HEPARIN SODIUM 5000 UNITS: 5000 INJECTION INTRAVENOUS; SUBCUTANEOUS at 06:08

## 2023-01-01 NOTE — ASSESSMENT & PLAN NOTE
Accurate ROS unable to be obtained from patient due to dementia  However he is currently without pain or other complaint  Per ED provider patient did have some complaints of dull generalized abdominal pain, sent in from outpatient care facility who reports patient has had increasing abdominal pain, distention, and decreased drainage from his percutaneous cholecystostomy tube      /75   Pulse 84   Temp 97 5 °F (36 4 °C)   Resp 15   Ht 5' 5" (1 651 m)   Wt 54 5 kg (120 lb 2 4 oz)   SpO2 97%   BMI 19 99 kg/m²     · Had percutaneous cholecystostomy drain placed 12/23 at [address] [place] [address]  · Outpatient care facility thought patient had increasing abd distention, pain, and decreased drain output  · Patient currently denies any abd pain/complaint however has prior stroke and possible dementia hx   · Abdomen is soft and with mild RUQ tenderness to deep palpation currently  · CT showing some pericholecystic fluid/fat stranding  · Not meeting SEPSIS criteria currently  · ED gave IV cefepime; continue overnight  · Trend WBC; f/u w/ pending infectious labs   · IR consulted to assess drain

## 2023-01-01 NOTE — PLAN OF CARE
Problem: Potential for Falls  Goal: Patient will remain free of falls  Description: INTERVENTIONS:  - Educate patient/family on patient safety including physical limitations  - Instruct patient to call for assistance with activity   - Consult OT/PT to assist with strengthening/mobility   - Keep Call bell within reach  - Keep bed low and locked with side rails adjusted as appropriate  - Keep care items and personal belongings within reach  - Initiate and maintain comfort rounds  - Make Fall Risk Sign visible to staff  - Offer Toileting every 2 Hours, in advance of need  - Initiate/Maintain alarm  - Obtain necessary fall risk management equipment  - Apply yellow socks and bracelet for high fall risk patients  - Consider moving patient to room near nurses station  Outcome: Progressing     Problem: Prexisting or High Potential for Compromised Skin Integrity  Goal: Skin integrity is maintained or improved  Description: INTERVENTIONS:  - Identify patients at risk for skin breakdown  - Assess and monitor skin integrity  - Assess and monitor nutrition and hydration status  - Monitor labs   - Assess for incontinence   - Turn and reposition patient  - Assist with mobility/ambulation  - Relieve pressure over bony prominences  - Avoid friction and shearing  - Provide appropriate hygiene as needed including keeping skin clean and dry  - Evaluate need for skin moisturizer/barrier cream  - Collaborate with interdisciplinary team   - Patient/family teaching  - Consider wound care consult   Outcome: Progressing     Problem: MOBILITY - ADULT  Goal: Maintain or return to baseline ADL function  Description: INTERVENTIONS:  -  Assess patient's ability to carry out ADLs; assess patient's baseline for ADL function and identify physical deficits which impact ability to perform ADLs (bathing, care of mouth/teeth, toileting, grooming, dressing, etc )  - Assess/evaluate cause of self-care deficits   - Assess range of motion  - Assess patient's mobility; develop plan if impaired  - Assess patient's need for assistive devices and provide as appropriate  - Encourage maximum independence but intervene and supervise when necessary  - Involve family in performance of ADLs  - Assess for home care needs following discharge   - Consider OT consult to assist with ADL evaluation and planning for discharge  - Provide patient education as appropriate  Outcome: Progressing  Goal: Maintains/Returns to pre admission functional level  Description: INTERVENTIONS:  - Perform BMAT or MOVE assessment daily    - Set and communicate daily mobility goal to care team and patient/family/caregiver  - Collaborate with rehabilitation services on mobility goals if consulted  - Perform Range of Motion 2 times a day  - Reposition patient every 2 hours    - Dangle patient   - Stand patient   - Ambulate patient   - Out of bed to chair   - Out of bed for meals   - Out of bed for toileting  - Record patient progress and toleration of activity level   Outcome: Progressing

## 2023-01-01 NOTE — ASSESSMENT & PLAN NOTE
Lab Results   Component Value Date    EGFR 50 12/31/2022    EGFR 48 12/31/2022    EGFR 63 04/04/2018    CREATININE 1 35 (H) 12/31/2022    CREATININE 1 40 (H) 12/31/2022    CREATININE 1 16 04/04/2018     · Creatinine stable   · AM BMP

## 2023-01-01 NOTE — CASE MANAGEMENT
Case Management Assessment & Discharge Planning Note    Patient name Rene Hayes  Location [address] [id number]/-[id number] MRN [id number]  : 1945 Date 2023       Current Admission Date: 2022  Current Admission Diagnosis:Cholecystostomy drain infection [facility])   Patient Active Problem List    Diagnosis Date Noted   • A-fib ([facility] ) 2022   • CKD (chronic kidney disease) 2022   • Dementia ([facility] ) 2022   • [facility]olecyst[facility] drain infection ([facility] ) 2022   • Gait abnormality 2022   • Language barrier 2022   • Asymptomatic proteinuria 2018   • Iron deficiency anemia 2018   • Benign essential hypertension 2016   • Calcification of coronary artery 2016   • Hyperlipidemia 2016   • Type 2 diabetes mellitus without complication ([facility] )       LOS (days): 0  Geometric Mean LOS (GMLOS) (days):   Days to GMLOS:     OBJECTIVE:        Current admission status: Inpatient       Preferred Pharmacy: No Pharmacies Listed  Primary Care Provider: No primary care provider on file  Primary Insurance: MEDICARE  Secondary Insurance: [address] [place] [address]    ASSESSMENT:    Readmission Root Cause  30 Day Readmission: No    Patient Information  Admitted from[de-identified] Facility ([place] [address])  Mental Status: Confused  During Assessment patient was accompanied by: Not accompanied during assessment  Assessment information provided by[de-identified] Friend  Primary Caregiver: Self  Support Systems: Self  County of Residence: [address] [place] [address] do you live in?: [address] [place] [address] entry access options   Select all that apply : No steps to enter home  Type of Current Residence: Facility [place] [address])  Upon entering residence, is there a bedroom on the main floor (no further steps)?: Yes  Upon entering residence, is there a bathroom on the main floor (no further steps)?: Yes  In the last 12 months, was there a time when you were not able to pay the mortgage or rent on time?: No  In the last 12 months, how many places have you lived?: 1  In the last 12 months, was there a time when you did not have a steady place to sleep or slept in a shelter (including now)?: No  Homeless/housing insecurity resource given?: No  Living Arrangements: Other (Comment)  Is patient a ?: No    Activities of Daily Living Prior to Admission  Functional Status: Assistance  Completes ADLs independently?: No  Level of ADL dependence: Assistance  Ambulates independently?: No  Level of ambulatory dependence: Assistance    Patient Information Continued  Income Source: SSI/SSD  Does patient have prescription coverage?: Yes  Within the past 12 months, you worried that your food would run out before you got the money to buy more : Never true  Within the past 12 months, the food you bought just didn't last and you didn't have money to get more : Never true  Food insecurity resource given?: No  Does patient receive dialysis treatments?: No  Does patient have a history of substance abuse?: No  Does patient have a history of Mental Health Diagnosis?: No    PHQ 2/9 Screening   Reviewed PHQ 2/9 Depression Screening Score?: No    Means of Transportation  In the past 12 months, has lack of transportation kept you from medical appointments or from getting medications?: No  In the past 12 months, has lack of transportation kept you from meetings, work, or from getting things needed for daily living?: No  Was application for public transport provided?: No    DISCHARGE DETAILS:    Discharge planning discussed with[de-identified] Patient's friend Ama Mccall)  Freedom of Choice: Yes  Comments - Freedom of Choice: Choice is to return to [place] [address] @ D/C  CM contacted family/caregiver?: Yes  Were Treatment Team discharge recommendations reviewed with patient/caregiver?: Yes  Did patient/caregiver verbalize understanding of patient care needs?: N/A- going to facility  Were patient/caregiver advised of the risks associated with not following Treatment Team discharge recommendations?: Yes    Contacts  Patient Contacts: Verner Buoy  Relationship to Patient[de-identified] Friend  Contact Method: Phone  Phone Number: [phone]  Reason/Outcome: Emergency [address]         Is the patient interested in [place] [address] at discharge?: No    DME Referral Provided  Referral made for DME?: No    Other Referral/Resources/Interventions Provided:  Interventions: Facility Return, SNF  Referral Comments: Facility return referral opened to [facility] via Sindi Grijalva reports patient is LTC @ facility      Would you like to participate in our [address] service program?  : No - Declined    Treatment Team Recommendation: Facility Return, SNF  Discharge Destination Plan[de-identified] Facility Return, SNF ([address])  Transport at Discharge : [facility] Ambulance

## 2023-01-01 NOTE — CONSULTS
e-Consult (IPC)  - Interventional Radiology  Marilyn Graham 69 y o  male MRN: [id number]  Unit/Bed#: -01 Encounter: [id number]          Interventional Radiology has been consulted to evaluate Marilyn Graham    We were consulted by medicine concerning this patient  Consults  01/01/23    Assessment/Recommendation:   69 yom PMH dementia, abdominal pain  He had a perc sylvain placed at [facility] 2 weeks ago  Drainage has apparently been low  Recent CT shows good positioning of the catheter in the GB  IR consulted for tube check  Discussed with Dr Jaja Rendon  This can be performed early in the week pending scheduling availability  He does not need to be NPO  >5 min, >50% time spent reviewing/analyzing record, written report will be generated in the EMR  Thank you for allowing Interventional Radiology to participate in the care of Marilyn Graham  Please don't hesitate to call or TigerText us with any questions       Сергей Frank MD

## 2023-01-01 NOTE — PROGRESS NOTES
[facility]  Progress Note - Corina Reasons 1945, 69 y o  male MRN: [id number]  Unit/Bed#: MS [facility]coll Encounter: [id number]  Primary Care Provider: No primary care provider on file  Date and time admitted to hospital: 12/31/2022 12:06 AM    * Cholecystostomy drain infection [facility])  Assessment & Plan  Accurate ROS unable to be obtained from patient due to dementia  However he is currently without pain or other complaint  Per ED provider patient did have some complaints of dull generalized abdominal pain, sent in from outpatient care facility who reports patient has had increasing abdominal pain, distention, and decreased drainage from his percutaneous cholecystostomy tube      /75   Pulse 84   Temp 97 5 °F (36 4 °C)   Resp 15   Ht 5' 5" (1 651 m)   Wt 54 5 kg (120 lb 2 4 oz)   SpO2 97%   BMI 19 99 kg/m²     · Had percutaneous cholecystostomy drain placed 12/23 at [address] [place] [address]  · Outpatient care facility thought patient had increasing abd distention, pain, and decreased drain output  · Patient currently denies any abd pain/complaint however has prior stroke and possible dementia hx   · Abdomen is soft and with mild RUQ tenderness to deep palpation currently  · CT showing some pericholecystic fluid/fat stranding  · Not meeting SEPSIS criteria currently  · ED gave IV cefepime; continue overnight  · Trend WBC; f/u w/ pending infectious labs   · IR consulted to assess drain      Dementia ([facility] )  Assessment & Plan  · Able to answer questions and follow commands  · However does appear mildly confused as he does not remember having surgery recently   · Continue home aricept     CKD (chronic kidney disease)  Assessment & Plan  Lab Results   Component Value Date    EGFR 50 12/31/2022    EGFR 48 12/31/2022    EGFR 63 04/04/2018    CREATININE 1 35 (H) 12/31/2022    CREATININE 1 40 (H) 12/31/2022    CREATININE 1 16 04/04/2018     · Creatinine stable   · AM BMP     A-fib [facility])  Assessment & Plan  · Continue home carvedilol    Type 2 diabetes mellitus without complication (HCC)  Assessment & Plan  · Continue sliding scale    Hyperlipidemia  Assessment & Plan  · Continue Lipitor    Benign essential hypertension  Assessment & Plan  · BP controlled       VTE Pharmacologic Prophylaxis:   Pharmacologic: Heparin  Mechanical VTE Prophylaxis in Place: Yes    Patient Centered Rounds: I have performed bedside rounds with nursing staff today  Discussions with Specialists or Other Care Team Provider: cm, nursing    Education and Discussions with Family / Patient: pt    Time Spent for Care: 30 minutes  More than 50% of total time spent on counseling and coordination of care as described above  Current Length of Stay: 0 day(s)    Current Patient Status: Observation   Certification Statement: The patient will continue to require additional inpatient hospital stay due to see below    Discharge Plan: Pending IR drain check   Code Status: Level 1 - Full Code      Subjective:   denies fevers, chills, cough, chest pain       Objective:     Vitals:   Temp (24hrs), Av 7 °F (36 5 °C), Min:97 4 °F (36 3 °C), Max:98 1 °F (36 7 °C)    Temp:  [97 4 °F (36 3 °C)-98 1 °F (36 7 °C)] 97 5 °F (36 4 °C)  HR:  [71-84] 84  Resp:  [15-18] 15  BP: (115-167)/(58-76) 154/75  SpO2:  [96 %-100 %] 97 %  Body mass index is 19 99 kg/m²  Input and Output Summary (last 24 hours): Intake/Output Summary (Last 24 hours) at 2023 1116  Last data filed at 2023 4073  Gross per 24 hour   Intake 340 ml   Output 610 ml   Net -270 ml       Physical Exam:     Physical Exam  Constitutional:       General: He is not in acute distress  Appearance: He is well-developed  He is not diaphoretic  HENT:      Head: Normocephalic and atraumatic  Nose: Nose normal       Mouth/Throat:      Pharynx: No oropharyngeal exudate  Eyes:      General: No scleral icterus       Conjunctiva/sclera: Conjunctivae normal  Cardiovascular:      Rate and Rhythm: Normal rate and regular rhythm  Heart sounds: Normal heart sounds  No murmur heard  No friction rub  No gallop  Pulmonary:      Effort: Pulmonary effort is normal  No respiratory distress  Breath sounds: Normal breath sounds  No wheezing or rales  Chest:      Chest wall: No tenderness  Abdominal:      General: Bowel sounds are normal  There is no distension  Palpations: Abdomen is soft  Tenderness: There is no abdominal tenderness  There is no guarding  Musculoskeletal:         General: No tenderness or deformity  Normal range of motion  Cervical back: Normal range of motion and neck supple  Skin:     General: Skin is warm and dry  Findings: No erythema  Neurological:      Mental Status: He is alert  Mental status is at baseline  Additional Data:     Labs:    Results from last 7 days   Lab Units 12/31/22  0519   WBC Thousand/uL 9 70   HEMOGLOBIN g/dL 7 8*   HEMATOCRIT % 25 5*   PLATELETS Thousands/uL 371   NEUTROS PCT % 85*   LYMPHS PCT % 8*   MONOS PCT % 6   EOS PCT % 0     Results from last 7 days   Lab Units 12/31/22  0519   SODIUM mmol/L 142   POTASSIUM mmol/L 3 8   CHLORIDE mmol/L 108   CO2 mmol/L 27   BUN mg/dL 38*   CREATININE mg/dL 1 35*   ANION GAP mmol/L 7   CALCIUM mg/dL 7 6*   ALBUMIN g/dL 1 5*   TOTAL BILIRUBIN mg/dL 0 46   ALK PHOS U/L 403*   ALT U/L 9*   AST U/L 17   GLUCOSE RANDOM mg/dL 202*         Results from last 7 days   Lab Units 01/01/23  1037 01/01/23  0736 12/31/22  2043 12/31/22  1708 12/31/22  1102 12/31/22  0629   POC GLUCOSE mg/dl 209* 155* 261* 265* 164* 200*                   * I Have Reviewed All Lab Data Listed Above  * Additional Pertinent Lab Tests Reviewed:  All Labs Within Last 24 Hours Reviewed    Imaging:    Imaging Reports Reviewed Today Include: na  Imaging Personally Reviewed by Myself Includes:  na    Recent Cultures (last 7 days):     Results from last 7 days   Lab Units 12/31/22  0519   BLOOD CULTURE  Received in Microbiology Lab  Culture in Progress  Received in Microbiology Lab  Culture in Progress  Last 24 Hours Medication List:   Current Facility-Administered Medications   Medication Dose Route Frequency Provider Last Rate   • acetaminophen  650 mg Oral Q6H PRN Hermelindo Coppola PA-C     • amLODIPine  5 mg Oral Daily Yasmeen Jaffe PA-C     • aspirin  81 mg Oral Daily Yasmeen Jaffe PA-C     • atorvastatin  20 mg Oral QPM Yasmeen Jaffe PA-C     • carvedilol  3 125 mg Oral BID With Meals Hermelindo Coppola PA-C     • cefepime  2,000 mg Intravenous Q12H Yasmeen Jaffe PA-C 2,000 mg (01/01/23 0326)   • clopidogrel  75 mg Oral Daily Yasmeen Jaffe PA-C     • donepezil  5 mg Oral HS Yasmeen Jaffe PA-C     • heparin (porcine)  5,000 Units Subcutaneous Q8H [address] Yasmeen Jaffe PA-C     • insulin lispro  1-5 Units Subcutaneous 4x Daily (AC & HS) Yasmeen Jaffe PA-C     • pantoprazole  40 mg Oral Daily Hermelindo Coppola PA-C          Today, Patient Was Seen By: Jw Hathaway MD    ** Please Note: Dictation voice to text software may have been used in the creation of this document   **

## 2023-01-02 LAB
GLUCOSE SERPL-MCNC: 155 MG/DL (ref 65–140)
GLUCOSE SERPL-MCNC: 198 MG/DL (ref 65–140)
GLUCOSE SERPL-MCNC: 206 MG/DL (ref 65–140)
GLUCOSE SERPL-MCNC: 254 MG/DL (ref 65–140)

## 2023-01-02 RX ADMIN — HEPARIN SODIUM 5000 UNITS: 5000 INJECTION INTRAVENOUS; SUBCUTANEOUS at 21:37

## 2023-01-02 RX ADMIN — INSULIN LISPRO 1 UNITS: 100 INJECTION, SOLUTION INTRAVENOUS; SUBCUTANEOUS at 09:12

## 2023-01-02 RX ADMIN — INSULIN LISPRO 1 UNITS: 100 INJECTION, SOLUTION INTRAVENOUS; SUBCUTANEOUS at 17:58

## 2023-01-02 RX ADMIN — CEFEPIME 2000 MG: 2 INJECTION, POWDER, FOR SOLUTION INTRAVENOUS at 04:47

## 2023-01-02 RX ADMIN — HEPARIN SODIUM 5000 UNITS: 5000 INJECTION INTRAVENOUS; SUBCUTANEOUS at 06:00

## 2023-01-02 RX ADMIN — CARVEDILOL 3.12 MG: 3.12 TABLET, FILM COATED ORAL at 17:56

## 2023-01-02 RX ADMIN — ATORVASTATIN CALCIUM 20 MG: 20 TABLET, FILM COATED ORAL at 17:56

## 2023-01-02 RX ADMIN — AMLODIPINE BESYLATE 5 MG: 5 TABLET ORAL at 09:08

## 2023-01-02 RX ADMIN — CARVEDILOL 3.12 MG: 3.12 TABLET, FILM COATED ORAL at 09:11

## 2023-01-02 RX ADMIN — DONEPEZIL HYDROCHLORIDE 5 MG: 5 TABLET ORAL at 21:37

## 2023-01-02 RX ADMIN — INSULIN LISPRO 2 UNITS: 100 INJECTION, SOLUTION INTRAVENOUS; SUBCUTANEOUS at 21:42

## 2023-01-02 RX ADMIN — ASPIRIN 81 MG: 81 TABLET ORAL at 09:08

## 2023-01-02 RX ADMIN — PANTOPRAZOLE SODIUM 40 MG: 40 TABLET, DELAYED RELEASE ORAL at 09:08

## 2023-01-02 RX ADMIN — CLOPIDOGREL BISULFATE 75 MG: 75 TABLET ORAL at 09:08

## 2023-01-02 RX ADMIN — INSULIN LISPRO 1 UNITS: 100 INJECTION, SOLUTION INTRAVENOUS; SUBCUTANEOUS at 12:57

## 2023-01-02 NOTE — ASSESSMENT & PLAN NOTE
Accurate ROS unable to be obtained from patient due to dementia  However he is currently without pain or other complaint  Per ED provider patient did have some complaints of dull generalized abdominal pain, sent in from outpatient care facility who reports patient has had increasing abdominal pain, distention, and decreased drainage from his percutaneous cholecystostomy tube      /74   Pulse 77   Temp 98 1 °F (36 7 °C)   Resp 19   Ht 5' 5" (1 651 m)   Wt 54 5 kg (120 lb 2 4 oz)   SpO2 97%   BMI 19 99 kg/m²     · Had percutaneous cholecystostomy drain placed 12/23 at [address] [place] [address]  · Outpatient care facility thought patient had increasing abd distention, pain, and decreased drain output  · Patient currently denies any abd pain/complaint however has prior stroke and possible dementia hx   · Abdomen is soft and with mild RUQ tenderness to deep palpation currently  · CT showing some pericholecystic fluid/fat stranding  · Not meeting SEPSIS criteria currently  · Will discontinue antibiotics at this time  · IR consulted to assess drain will assess drain tomorrow on January 3

## 2023-01-02 NOTE — DISCHARGE INSTR - OTHER ORDERS
Skin care plans:  1-Hydraguard to bilateral sacrum, buttock and heels twice a day and as needed  2-Elevate heels to offload pressure  3-Waffle cushion in chair when out of bed  4-Moisturize skin daily with skin nourishing cream   5-Turn/reposition every 2 hours for pressure re-distribution on skin

## 2023-01-02 NOTE — WOUND OSTOMY CARE
Progress Note - Wound   Ada Bai 69 y o  male MRN: [id number]  Unit/Bed#: -01 Encounter: [id number]      Assessment:   Patient admitted due to cholecystostomy drain infection  History of diabetes, HTN, stroke, CKD, HLD, dementia  Patient is Romanian speaking only, staff member was at bedside to assist with translation  Patient agreeable to assessment, alert and oriented x 2, incontinent of bowel and bladder, external male urinary catheter in place for urine management, is assist of 2 to turn for assessment, pillow support for turning and repositioning, heels elevated off of mattress, is a moderate assist with care, accu-max pump is applied to mattress  1  Pressure injury right sacrum, DTI-POA- Wound appears to be healing  Wound is oval in shape, dry and intact skin, approx  40% non-blanchable purple intact skin and 60% non-blanchable brown intact skin  Christine-wound is dry, intact, blanchable skin  This wound has the potential to evolve to a full thickness injury, stage 3 or 4     2  Left sacrum, bilateral buttock, hips, elbows, and heels- skin is dry, intact, blanchable  Educated patient on importance of frequent offloading of pressure via turning, repositioning, and weight-shifting  No induration, fluctuance, odor, warmth, redness, or purulence noted to the above noted wound  Patient tolerated well  Primary nurse aware of plan of care  See flow sheets for more detailed assessment findings  Will follow along  Skin care plans:  1-Hydraguard to bilateral sacrum, buttock and heels BID and PRN  2-Elevate heels to offload pressure  3-Ehob cushion in chair when out of bed  4-Moisturize skin daily with skin nourishing cream   5-Turn/reposition q2h for pressure re-distribution on skin  6-Accu-max pump to mattress  Wound 12/31/22 Sacrum Right (Active)   Wound Image   01/02/23 0955   Wound Description Dry; Intact; Light purple 01/02/23 0955   Pressure Injury Stage DTPI 01/02/23 0955 Christine-wound Assessment Dry; Intact 01/02/23 0955   Wound Length (cm) 1 6 cm 01/02/23 0955   Wound Width (cm) 1 2 cm 01/02/23 0955   Wound Depth (cm) 0 cm 01/02/23 0955   Wound Surface Area (cm^2) 1 92 cm^2 01/02/23 0955   Wound Volume (cm^3) 0 cm^3 01/02/23 0955   Calculated Wound Volume (cm^3) 0 cm^3 01/02/23 0955   Tunneling 0 cm 01/02/23 0955   Undermining 0 01/02/23 0955   Drainage Amount None 01/02/23 0955   Non-staged Wound Description Not applicable 65/75/71 7004   Treatments Cleansed;Site care 01/02/23 0955   Dressing Moisture barrier 01/02/23 0955   Wound packed?  No 01/02/23 0955   Dressing Changed New 01/02/23 0955   Patient Tolerance Tolerated well 01/02/23 0955   Dressing Status       Call or tigertext with any questions  Wound Care will continue to follow    Maxwell Avendaño BSN RN CWON  Wound and Ostomy care

## 2023-01-02 NOTE — PROGRESS NOTES
[facility]  Progress Note - Tete Escalante 1945, 69 y o  male MRN: [id number]  Unit/Bed#: MS Driscoll Encounter: [id number]  Primary Care Provider: No primary care provider on file  Date and time admitted to hospital: 12/31/2022 12:06 AM    * Cholecystostomy drain infection [facility])  Assessment & Plan  Accurate ROS unable to be obtained from patient due to dementia  However he is currently without pain or other complaint  Per ED provider patient did have some complaints of dull generalized abdominal pain, sent in from outpatient care facility who reports patient has had increasing abdominal pain, distention, and decreased drainage from his percutaneous cholecystostomy tube      /74   Pulse 77   Temp 98 1 °F (36 7 °C)   Resp 19   Ht 5' 5" (1 651 m)   Wt 54 5 kg (120 lb 2 4 oz)   SpO2 97%   BMI 19 99 kg/m²     · Had percutaneous cholecystostomy drain placed 12/23 at [address] [place] [address]  · Outpatient care facility thought patient had increasing abd distention, pain, and decreased drain output  · Patient currently denies any abd pain/complaint however has prior stroke and possible dementia hx   · Abdomen is soft and with mild RUQ tenderness to deep palpation currently  · CT showing some pericholecystic fluid/fat stranding  · Not meeting SEPSIS criteria currently  · Will discontinue antibiotics at this time  · IR consulted to assess drain will assess drain tomorrow on January 3      Dementia [facility])  Assessment & Plan  · Able to answer questions and follow commands  · However does appear mildly confused as he does not remember having surgery recently   · Continue home Aricept    CKD (chronic kidney disease)  Assessment & Plan  · Creatinine stable    A-fib ([facility] )  Assessment & Plan  · Currently rate controlled  · Continue beta-blocker    Type 2 diabetes mellitus without complication ([facility] )  Assessment & Plan  · Continue sliding scale insulin    Hyperlipidemia  Assessment & Plan  · Continue statin    Benign essential hypertension  Assessment & Plan  · BP controlled      VTE Pharmacologic Prophylaxis:   Pharmacologic: Heparin  Mechanical VTE Prophylaxis in Place: Yes    Patient Centered Rounds: I have performed bedside rounds with nursing staff today  Discussions with Specialists or Other Care Team Provider: cm, nursing    Education and Discussions with Family / Patient: pt    Time Spent for Care: 30 minutes  More than 50% of total time spent on counseling and coordination of care as described above  Current Length of Stay: 1 day(s)    Current Patient Status: Inpatient   Certification Statement: The patient will continue to require additional inpatient hospital stay due to see below    Discharge Plan: Pending IR drain check    Code Status: Level 1 - Full Code      Subjective:   Denies fevers, chills, cough, chest pain    Objective:     Vitals:   Temp (24hrs), Av 8 °F (36 6 °C), Min:97 4 °F (36 3 °C), Max:98 1 °F (36 7 °C)    Temp:  [97 4 °F (36 3 °C)-98 1 °F (36 7 °C)] 98 1 °F (36 7 °C)  HR:  [76-77] 77  Resp:  [18-19] 19  BP: (144-152)/(74-75) 152/74  SpO2:  [95 %-97 %] 97 %  Body mass index is 19 99 kg/m²  Input and Output Summary (last 24 hours): Intake/Output Summary (Last 24 hours) at 2023 1106  Last data filed at 2023 3225  Gross per 24 hour   Intake 660 ml   Output 1900 ml   Net -1240 ml       Physical Exam:     Physical Exam  Constitutional:       General: He is not in acute distress  Appearance: He is well-developed  He is not diaphoretic  HENT:      Head: Normocephalic and atraumatic  Nose: Nose normal       Mouth/Throat:      Pharynx: No oropharyngeal exudate  Eyes:      General: No scleral icterus  Conjunctiva/sclera: Conjunctivae normal    Cardiovascular:      Rate and Rhythm: Normal rate and regular rhythm  Heart sounds: Normal heart sounds  No murmur heard  No friction rub  No gallop     Pulmonary:      Effort: Pulmonary effort is normal  No respiratory distress  Breath sounds: Normal breath sounds  No wheezing or rales  Chest:      Chest wall: No tenderness  Abdominal:      General: Bowel sounds are normal  There is no distension  Palpations: Abdomen is soft  Tenderness: There is no abdominal tenderness  There is no guarding  Musculoskeletal:         General: No tenderness or deformity  Normal range of motion  Cervical back: Normal range of motion and neck supple  Skin:     General: Skin is warm and dry  Findings: No erythema  Neurological:      Mental Status: He is alert  Mental status is at baseline  Additional Data:     Labs:    Results from last 7 days   Lab Units 12/31/22  0519   WBC Thousand/uL 9 70   HEMOGLOBIN g/dL 7 8*   HEMATOCRIT % 25 5*   PLATELETS Thousands/uL 371   NEUTROS PCT % 85*   LYMPHS PCT % 8*   MONOS PCT % 6   EOS PCT % 0     Results from last 7 days   Lab Units 12/31/22  0519   SODIUM mmol/L 142   POTASSIUM mmol/L 3 8   CHLORIDE mmol/L 108   CO2 mmol/L 27   BUN mg/dL 38*   CREATININE mg/dL 1 35*   ANION GAP mmol/L 7   CALCIUM mg/dL 7 6*   ALBUMIN g/dL 1 5*   TOTAL BILIRUBIN mg/dL 0 46   ALK PHOS U/L 403*   ALT U/L 9*   AST U/L 17   GLUCOSE RANDOM mg/dL 202*         Results from last 7 days   Lab Units 01/02/23  0703 01/01/23  2040 01/01/23  1625 01/01/23  1037 01/01/23  0736 12/31/22  2043 12/31/22  1708 12/31/22  1102 12/31/22  0629   POC GLUCOSE mg/dl 155* 173* 264* 209* 155* 261* 265* 164* 200*                   * I Have Reviewed All Lab Data Listed Above  * Additional Pertinent Lab Tests Reviewed: All Labs Within Last 24 Hours Reviewed    Imaging:    Imaging Reports Reviewed Today Include: na  Imaging Personally Reviewed by Myself Includes:  na    Recent Cultures (last 7 days):     Results from last 7 days   Lab Units 12/31/22  0519   BLOOD CULTURE  No Growth at 24 hrs  No Growth at 24 hrs         Last 24 Hours Medication List:   Current Facility-Administered Medications Medication Dose Route Frequency Provider Last Rate   • acetaminophen  650 mg Oral Q6H PRN Gerri Cortez PA-C     • amLODIPine  5 mg Oral Daily Yasmeen Jaffe PA-C     • aspirin  81 mg Oral Daily Yasmeen Jaffe PA-C     • atorvastatin  20 mg Oral QPM Yasmeen Jaffe PA-C     • carvedilol  3 125 mg Oral BID With Meals Gerri Cortez PA-C     • clopidogrel  75 mg Oral Daily Yasmeen Jaffe PA-C     • donepezil  5 mg Oral HS Yasmeen Jaffe PA-C     • heparin (porcine)  5,000 Units Subcutaneous Q8H [facility] Yasmeen Jaffe PA-C     • insulin lispro  1-5 Units Subcutaneous 4x Daily (AC & HS) Yasmeen Jaffe PA-C     • pantoprazole  40 mg Oral Daily Gerri Cortez PA-C          Today, Patient Was Seen By: Serene Buerger, MD    ** Please Note: Dictation voice to text software may have been used in the creation of this document   **

## 2023-01-03 PROBLEM — T85.518A CHOLECYSTOSTOMY TUBE DYSFUNCTION: Status: ACTIVE | Noted: 2023-01-03

## 2023-01-03 LAB
ANION GAP SERPL CALCULATED.3IONS-SCNC: 7 MMOL/L (ref 4–13)
BASOPHILS # BLD AUTO: 0.02 THOUSANDS/ÂΜL (ref 0–0.1)
BASOPHILS NFR BLD AUTO: 0 % (ref 0–1)
BUN SERPL-MCNC: 25 MG/DL (ref 5–25)
CALCIUM SERPL-MCNC: 7.8 MG/DL (ref 8.3–10.1)
CHLORIDE SERPL-SCNC: 106 MMOL/L (ref 96–108)
CO2 SERPL-SCNC: 29 MMOL/L (ref 21–32)
CREAT SERPL-MCNC: 1.66 MG/DL (ref 0.6–1.3)
EOSINOPHIL # BLD AUTO: 0.1 THOUSAND/ÂΜL (ref 0–0.61)
EOSINOPHIL NFR BLD AUTO: 1 % (ref 0–6)
ERYTHROCYTE [DISTWIDTH] IN BLOOD BY AUTOMATED COUNT: 17.7 % (ref 11.6–15.1)
GFR SERPL CREATININE-BSD FRML MDRD: 39 ML/MIN/1.73SQ M
GLUCOSE SERPL-MCNC: 157 MG/DL (ref 65–140)
GLUCOSE SERPL-MCNC: 175 MG/DL (ref 65–140)
GLUCOSE SERPL-MCNC: 199 MG/DL (ref 65–140)
GLUCOSE SERPL-MCNC: 209 MG/DL (ref 65–140)
GLUCOSE SERPL-MCNC: 230 MG/DL (ref 65–140)
HCT VFR BLD AUTO: 26 % (ref 36.5–49.3)
HGB BLD-MCNC: 7.9 G/DL (ref 12–17)
IMM GRANULOCYTES # BLD AUTO: 0.06 THOUSAND/UL (ref 0–0.2)
IMM GRANULOCYTES NFR BLD AUTO: 1 % (ref 0–2)
LYMPHOCYTES # BLD AUTO: 1.2 THOUSANDS/ÂΜL (ref 0.6–4.47)
LYMPHOCYTES NFR BLD AUTO: 11 % (ref 14–44)
MCH RBC QN AUTO: 26.7 PG (ref 26.8–34.3)
MCHC RBC AUTO-ENTMCNC: 30.4 G/DL (ref 31.4–37.4)
MCV RBC AUTO: 88 FL (ref 82–98)
MONOCYTES # BLD AUTO: 0.64 THOUSAND/ÂΜL (ref 0.17–1.22)
MONOCYTES NFR BLD AUTO: 6 % (ref 4–12)
NEUTROPHILS # BLD AUTO: 8.51 THOUSANDS/ÂΜL (ref 1.85–7.62)
NEUTS SEG NFR BLD AUTO: 81 % (ref 43–75)
NRBC BLD AUTO-RTO: 0 /100 WBCS
PLATELET # BLD AUTO: 436 THOUSANDS/UL (ref 149–390)
PMV BLD AUTO: 10 FL (ref 8.9–12.7)
POTASSIUM SERPL-SCNC: 3.5 MMOL/L (ref 3.5–5.3)
RBC # BLD AUTO: 2.96 MILLION/UL (ref 3.88–5.62)
SODIUM SERPL-SCNC: 142 MMOL/L (ref 135–147)
WBC # BLD AUTO: 10.53 THOUSAND/UL (ref 4.31–10.16)

## 2023-01-03 RX ADMIN — INSULIN LISPRO 1 UNITS: 100 INJECTION, SOLUTION INTRAVENOUS; SUBCUTANEOUS at 22:43

## 2023-01-03 RX ADMIN — AMLODIPINE BESYLATE 5 MG: 5 TABLET ORAL at 09:14

## 2023-01-03 RX ADMIN — DONEPEZIL HYDROCHLORIDE 5 MG: 5 TABLET ORAL at 22:43

## 2023-01-03 RX ADMIN — INSULIN LISPRO 1 UNITS: 100 INJECTION, SOLUTION INTRAVENOUS; SUBCUTANEOUS at 13:00

## 2023-01-03 RX ADMIN — CLOPIDOGREL BISULFATE 75 MG: 75 TABLET ORAL at 09:14

## 2023-01-03 RX ADMIN — INSULIN LISPRO 2 UNITS: 100 INJECTION, SOLUTION INTRAVENOUS; SUBCUTANEOUS at 17:40

## 2023-01-03 RX ADMIN — HEPARIN SODIUM 5000 UNITS: 5000 INJECTION INTRAVENOUS; SUBCUTANEOUS at 05:29

## 2023-01-03 RX ADMIN — ASPIRIN 81 MG: 81 TABLET ORAL at 09:15

## 2023-01-03 RX ADMIN — ATORVASTATIN CALCIUM 20 MG: 20 TABLET, FILM COATED ORAL at 17:38

## 2023-01-03 RX ADMIN — PANTOPRAZOLE SODIUM 40 MG: 40 TABLET, DELAYED RELEASE ORAL at 09:14

## 2023-01-03 RX ADMIN — CARVEDILOL 3.12 MG: 3.12 TABLET, FILM COATED ORAL at 17:38

## 2023-01-03 RX ADMIN — CARVEDILOL 3.12 MG: 3.12 TABLET, FILM COATED ORAL at 09:14

## 2023-01-03 NOTE — CASE MANAGEMENT
Case Management Discharge Planning Note    Patient name Wandy Hillman  Location /-01 MRN [id number]  : 1945 Date 1/3/2023       Current Admission Date: 2022  Current Admission Diagnosis:Cholecystostomy tube dysfunction   Patient Active Problem List    Diagnosis Date Noted   • Cholecystostomy tube dysfunction 2023   • A-fib ([facility] ) 2022   • CKD (chronic kidney disease) 2022   • Dementia ([address] ) 2022   • Cholecystostomy drain infection ([facility] [facility] ) 2022   • Gait abnormality 2022   • Language barrier 2022   • Asymptomatic proteinuria 2018   • Iron deficiency anemia 2018   • Benign essential hypertension 2016   • Calcification of coronary artery 2016   • Hyperlipidemia 2016   • Type 2 diabetes mellitus without complication ([facility] [address] )       LOS (days): 2  Geometric Mean LOS (GMLOS) (days): 2 50  Days to GMLOS:0 4     OBJECTIVE:  Risk of Unplanned Readmission Score: 13 92         Current admission status: Inpatient   Preferred Pharmacy: No Pharmacies Listed  Primary Care Provider: No primary care provider on file  Primary Insurance: MEDICARE  Secondary Insurance: [facility]    DISCHARGE DETAILS:    Discharge planning discussed with[de-identified] Patient's friend Leigh Tuckert of Choice: Yes  Comments - Freedom of Choice: Felisa Virk now requesting he NOT return to [employer] as he is npw approved for [facility] 17 hrs a day 7 days a week through [facility]- [phone]  She would also like him referred back to [facility]  Prior he used [facility] [place] [facility] [phone]  He will need BLS as there are 20 steps up to the entrance of house    CM contacted family/caregiver?: Yes  Were Treatment Team discharge recommendations reviewed with patient/caregiver?: Yes  Did patient/caregiver verbalize understanding of patient care needs?: Yes  Were patient/caregiver advised of the risks associated with not following Treatment Team discharge recommendations?: Yes    Contacts  Patient Contacts: Gia Jason  Relationship to Patient[de-identified] Friend  Contact Method: Phone  Phone Number: [phone]  Reason/Outcome: Emergency [address]         Is the patient interested in [address] at discharge?: Yes  [address] requested[de-identified] Nursing, Occupational Therapy, Physical [address] Name[de-identified] Other ([facility] [phone])  [facility] Provider[de-identified] PCP  Home Health Services Needed[de-identified] Evaluate Functional Status and Safety, Gait/ADL Training, Strengthening/Theraputic Exercises to Improve Function  Homebound Criteria Met[de-identified] Requires the Assistance of Another Person for Safe Ambulation or to Leave the Home  Supporting Clincal Findings[de-identified] Fatigues Easliy in [employer], Cognitive Deficit Requiring the Assistance of Others, Limited Endurance         Other Referral/Resources/Interventions Provided:  Interventions: HHC, HHA, Transportation  Referral Comments: CM will refer to [facility] via [address] for skilled [address]    Friend has already alerted [facility]  for [facility] tent 1/4/2023    Would you like to participate in our [address] service program?  : No - Declined    Treatment Team Recommendation: Short Term Rehab  Discharge Destination Plan[de-identified] Home with [place] at Discharge : [facility]  Dispatcher Contacted: Yes (Referral to [facility] for [facility] on 1/4/2023)

## 2023-01-03 NOTE — ASSESSMENT & PLAN NOTE
Accurate ROS unable to be obtained from patient due to dementia  However he is currently without pain or other complaint  Per ED provider patient did have some complaints of dull generalized abdominal pain, sent in from outpatient care facility who reports patient has had increasing abdominal pain, distention, and decreased drainage from his percutaneous cholecystostomy tube  /85   Pulse 81   Temp 98 5 °F (36 9 °C)   Resp 19   Ht 5' 5" (1 651 m)   Wt 54 5 kg (120 lb 2 4 oz)   SpO2 98%   BMI 19 99 kg/m²     Had percutaneous cholecystostomy drain placed 12/23 at [address] [place] [address]  Outpatient care facility thought patient had increasing abd distention, pain, and decreased drain output   Patient currently denies any abd pain/complaint however has prior stroke and possible dementia hx     · Abdomen is soft and with mild RUQ tenderness to deep palpation currently  · CT showing some pericholecystic fluid/fat stranding  · Off antibiotics at this time  · IR consulted to assess drain will assess drain, planned for January 3

## 2023-01-03 NOTE — PROGRESS NOTES
INTERNAL MEDICINE RESIDENCY PROGRESS NOTE     Name: Maegan Drake   Age & Sex: 69 y o  male   MRN: [id number]  Unit/Bed#: -Domingo   Encounter: [id number]      PATIENT INFORMATION     Name: Maegan Drake   Age & Sex: 69 y o  male   MRN: [id number]  Hospital Stay Days: 2    ASSESSMENT/PLAN     Principal Problem:    Cholecyst[facility] drain infection ([facility] )  Active Problems:    Benign essential hypertension    Hyperlipidemia    Type 2 diabetes mellitus without complication (HCC)    A-fib (HCC)    CKD (chronic kidney disease)    Dementia ([facility] )      Dementia ([place] [address] )  Assessment & Plan  · Able to answer questions and follow commands  · Continue home Aricept    CKD (chronic kidney disease)  Assessment & Plan  · Creatinine stable, unknown baseline, continue to trend daily    A-fib ([place] [address] )  Assessment & Plan  · Currently rate controlled  · Continue beta-blocker    Type 2 diabetes mellitus without complication ([facility])  Assessment & Plan  · Continue sliding scale insulin  · Hold home metformin, januvia    Hyperlipidemia  Assessment & Plan  · Continue home 20mg atorvastatin    Benign essential hypertension  Assessment & Plan  · BP controlled, continue with home Amlodipine and Coreg    * Cholecystostomy drain infection ([facility] )  Assessment & Plan  Accurate ROS unable to be obtained from patient due to dementia  However he is currently without pain or other complaint  Per ED provider patient did have some complaints of dull generalized abdominal pain, sent in from outpatient care facility who reports patient has had increasing abdominal pain, distention, and decreased drainage from his percutaneous cholecystostomy tube  /85   Pulse 81   Temp 98 5 °F (36 9 °C)   Resp 19   Ht 5' 5" (1 651 m)   Wt 54 5 kg (120 lb 2 4 oz)   SpO2 98%   BMI 19 99 kg/m²     Had percutaneous cholecystostomy drain placed 12/23 at [address] [place] [address]   Outpatient care facility thought patient had increasing abd distention, pain, and decreased drain output  Patient currently denies any abd pain/complaint however has prior stroke and possible dementia hx     · Abdomen is soft and with mild RUQ tenderness to deep palpation currently  · CT showing some pericholecystic fluid/fat stranding  · Off antibiotics at this time  · IR consulted to assess drain will assess drain, planned for January 3        Disposition: Pending IR eval, patient's niece would like patient to go home and not rehab     SUBJECTIVE     Patient seen and examined  No acute events overnight  Denies any pain, fevers, chills  Did not want to answer questions today  OBJECTIVE     Vitals:    23 1443 23 2144 23 2334 23 0800   BP: 135/66  140/66 170/85   Pulse: 82  87 81   Resp: 18  19    Temp: 97 6 °F (36 4 °C)  98 5 °F (36 9 °C)    TempSrc:       SpO2: 98% 97% 96% 98%   Weight:       Height:          Temperature:   Temp (24hrs), Av 1 °F (36 7 °C), Min:97 6 °F (36 4 °C), Max:98 5 °F (36 9 °C)    Temperature: 98 5 °F (36 9 °C)  Intake & Output:  I/O        0701  / 0700  0701   07 0701   0700    P  O  540 540     Total Intake(mL/kg) 540 (9 9) 540 (9 9)     Urine (mL/kg/hr) 1900 (1 5) 2000 (1 5)     Emesis/NG output       Total Output 1900      Net -1360 -1460                Weights:   IBW (Ideal Body Weight): 61 5 kg    Body mass index is 19 99 kg/m²  Weight (last 2 days)     None        Physical Exam  Vitals reviewed  Constitutional:       General: He is not in acute distress  Appearance: Normal appearance  He is not ill-appearing  HENT:      Head: Normocephalic and atraumatic  Cardiovascular:      Rate and Rhythm: Normal rate and regular rhythm  Pulses: Normal pulses  Heart sounds: Normal heart sounds  No murmur heard  Pulmonary:      Effort: Pulmonary effort is normal  No respiratory distress  Breath sounds: Normal breath sounds  No wheezing, rhonchi or rales  Abdominal:      General: Abdomen is flat  Bowel sounds are normal  There is no distension  Palpations: Abdomen is soft  Tenderness: There is abdominal tenderness (RUQ, RLQ)  There is no guarding  Musculoskeletal:         General: No swelling  Right lower leg: No edema  Left lower leg: No edema  Skin:     General: Skin is warm and dry  Capillary Refill: Capillary refill takes less than 2 seconds  Coloration: Skin is not jaundiced  Neurological:      Mental Status: He is alert  Mental status is at baseline  Psychiatric:         Mood and Affect: Mood normal          Behavior: Behavior normal        LABORATORY DATA     Labs: I have personally reviewed pertinent reports  Results from last 7 days   Lab Units 01/03/23 0444 12/31/22 0519 12/31/22  0047   WBC Thousand/uL 10 53* 9 70 11 79*   HEMOGLOBIN g/dL 7 9* 7 8* 8 9*   HEMATOCRIT % 26 0* 25 5* 29 9*   PLATELETS Thousands/uL 436* 371 424*   NEUTROS PCT % 81* 85* 89*   MONOS PCT % 6 6 4      Results from last 7 days   Lab Units 01/03/23 0444 12/31/22 0519 12/31/22  0047   POTASSIUM mmol/L 3 5 3 8 3 9   CHLORIDE mmol/L 106 108 105   CO2 mmol/L 29 27 26   BUN mg/dL 25 38* 39*   CREATININE mg/dL 1 66* 1 35* 1 40*   CALCIUM mg/dL 7 8* 7 6* 7 8*   ALK PHOS U/L  --  403* 460*   ALT U/L  --  9* 13   AST U/L  --  17 17                            IMAGING & DIAGNOSTIC TESTING     Radiology Results: I have personally reviewed pertinent reports  CT abdomen pelvis with contrast    Result Date: 12/31/2022  Impression: 1  Percutaneous cholecystostomy catheter seen with its pigtail in the collapsed gallbladder  There is cholelithiasis  There is pericholecystic fluid/fat stranding which is nonspecific in setting of small to moderate ascites  2   Mild diffuse thickening and hyperenhancement of the rectosigmoid colon which may be due to nonspecific colitis/proctitis  Gaseous distention of the transverse and descending colon   3   Mild diffuse thickening of the stomach wall may be due to gastritis  4   Small bilateral pleural effusions  5   Mild thickening of the urinary bladder wall which may be due to chronic bladder outlet obstruction, correlate with urinalysis to exclude cystitis  Workstation performed: [id number]     Other Diagnostic Testing: I have personally reviewed pertinent reports  ACTIVE MEDICATIONS     Current Facility-Administered Medications   Medication Dose Route Frequency   • acetaminophen (TYLENOL) tablet 650 mg  650 mg Oral Q6H PRN   • amLODIPine (NORVASC) tablet 5 mg  5 mg Oral Daily   • aspirin (ECOTRIN LOW STRENGTH) EC tablet 81 mg  81 mg Oral Daily   • atorvastatin (LIPITOR) tablet 20 mg  20 mg Oral QPM   • carvedilol (COREG) tablet 3 125 mg  3 125 mg Oral BID With Meals   • clopidogrel (PLAVIX) tablet 75 mg  75 mg Oral Daily   • donepezil (ARICEPT) tablet 5 mg  5 mg Oral HS   • heparin (porcine) subcutaneous injection 5,000 Units  5,000 Units Subcutaneous Q8H [facility]   • insulin lispro (HumaLOG) 100 units/mL subcutaneous injection 1-5 Units  1-5 Units Subcutaneous 4x Daily (AC & HS)   • pantoprazole (PROTONIX) EC tablet 40 mg  40 mg Oral Daily       VTE Pharmacologic Prophylaxis: Heparin  VTE Mechanical Prophylaxis: sequential compression device    Portions of the record may have been created with voice recognition software  Occasional wrong word or "sound a like" substitutions may have occurred due to the inherent limitations of voice recognition software    Read the chart carefully and recognize, using context, where substitutions have occurred   ==  Charo Quinteros, [facility]  Internal Medicine Residency PGY-2

## 2023-01-04 ENCOUNTER — APPOINTMENT (OUTPATIENT)
Dept: NON INVASIVE DIAGNOSTICS | Facility: HOSPITAL | Age: 78
End: 2023-01-04
Attending: STUDENT IN AN ORGANIZED HEALTH CARE EDUCATION/TRAINING PROGRAM

## 2023-01-04 PROBLEM — K80.50 CHOLEDOCHOLITHIASIS: Status: ACTIVE | Noted: 2023-01-04

## 2023-01-04 LAB
ALBUMIN SERPL BCP-MCNC: 1.8 G/DL (ref 3.5–5)
ALP SERPL-CCNC: 525 U/L (ref 46–116)
ALT SERPL W P-5'-P-CCNC: 15 U/L (ref 12–78)
ANION GAP SERPL CALCULATED.3IONS-SCNC: 10 MMOL/L (ref 4–13)
AST SERPL W P-5'-P-CCNC: 22 U/L (ref 5–45)
BACTERIA UR QL AUTO: ABNORMAL /HPF
BASOPHILS # BLD AUTO: 0.04 THOUSANDS/ÂΜL (ref 0–0.1)
BASOPHILS NFR BLD AUTO: 0 % (ref 0–1)
BILIRUB SERPL-MCNC: 0.55 MG/DL (ref 0.2–1)
BILIRUB UR QL STRIP: NEGATIVE
BUDDING YEAST: PRESENT
BUN SERPL-MCNC: 18 MG/DL (ref 5–25)
CALCIUM ALBUM COR SERPL-MCNC: 9.9 MG/DL (ref 8.3–10.1)
CALCIUM SERPL-MCNC: 8.1 MG/DL (ref 8.3–10.1)
CHLORIDE SERPL-SCNC: 104 MMOL/L (ref 96–108)
CLARITY UR: CLEAR
CO2 SERPL-SCNC: 28 MMOL/L (ref 21–32)
COLOR UR: ABNORMAL
CREAT SERPL-MCNC: 1.28 MG/DL (ref 0.6–1.3)
EOSINOPHIL # BLD AUTO: 0.15 THOUSAND/ÂΜL (ref 0–0.61)
EOSINOPHIL NFR BLD AUTO: 1 % (ref 0–6)
ERYTHROCYTE [DISTWIDTH] IN BLOOD BY AUTOMATED COUNT: 17.4 % (ref 11.6–15.1)
GFR SERPL CREATININE-BSD FRML MDRD: 54 ML/MIN/1.73SQ M
GLUCOSE SERPL-MCNC: 141 MG/DL (ref 65–140)
GLUCOSE SERPL-MCNC: 143 MG/DL (ref 65–140)
GLUCOSE SERPL-MCNC: 217 MG/DL (ref 65–140)
GLUCOSE SERPL-MCNC: 242 MG/DL (ref 65–140)
GLUCOSE SERPL-MCNC: 295 MG/DL (ref 65–140)
GLUCOSE UR STRIP-MCNC: NEGATIVE MG/DL
HCT VFR BLD AUTO: 27.6 % (ref 36.5–49.3)
HGB BLD-MCNC: 8.4 G/DL (ref 12–17)
HGB UR QL STRIP.AUTO: NEGATIVE
IMM GRANULOCYTES # BLD AUTO: 0.06 THOUSAND/UL (ref 0–0.2)
IMM GRANULOCYTES NFR BLD AUTO: 1 % (ref 0–2)
KETONES UR STRIP-MCNC: NEGATIVE MG/DL
LEUKOCYTE ESTERASE UR QL STRIP: NEGATIVE
LYMPHOCYTES # BLD AUTO: 1.18 THOUSANDS/ÂΜL (ref 0.6–4.47)
LYMPHOCYTES NFR BLD AUTO: 10 % (ref 14–44)
MCH RBC QN AUTO: 26.5 PG (ref 26.8–34.3)
MCHC RBC AUTO-ENTMCNC: 30.4 G/DL (ref 31.4–37.4)
MCV RBC AUTO: 87 FL (ref 82–98)
MONOCYTES # BLD AUTO: 0.63 THOUSAND/ÂΜL (ref 0.17–1.22)
MONOCYTES NFR BLD AUTO: 5 % (ref 4–12)
NEUTROPHILS # BLD AUTO: 9.67 THOUSANDS/ÂΜL (ref 1.85–7.62)
NEUTS SEG NFR BLD AUTO: 83 % (ref 43–75)
NITRITE UR QL STRIP: NEGATIVE
NON-SQ EPI CELLS URNS QL MICRO: ABNORMAL /HPF
NRBC BLD AUTO-RTO: 0 /100 WBCS
PH UR STRIP.AUTO: 7 [PH]
PLATELET # BLD AUTO: 499 THOUSANDS/UL (ref 149–390)
PMV BLD AUTO: 9.8 FL (ref 8.9–12.7)
POTASSIUM SERPL-SCNC: 3.3 MMOL/L (ref 3.5–5.3)
PROT SERPL-MCNC: 6.5 G/DL (ref 6.4–8.4)
PROT UR STRIP-MCNC: ABNORMAL MG/DL
RBC # BLD AUTO: 3.17 MILLION/UL (ref 3.88–5.62)
RBC #/AREA URNS AUTO: ABNORMAL /HPF
SODIUM SERPL-SCNC: 142 MMOL/L (ref 135–147)
SP GR UR STRIP.AUTO: 1.01 (ref 1–1.03)
UROBILINOGEN UR STRIP-ACNC: <2 MG/DL
WBC # BLD AUTO: 11.73 THOUSAND/UL (ref 4.31–10.16)
WBC #/AREA URNS AUTO: ABNORMAL /HPF

## 2023-01-04 RX ORDER — POTASSIUM CHLORIDE 20 MEQ/1
40 TABLET, EXTENDED RELEASE ORAL ONCE
Status: COMPLETED | OUTPATIENT
Start: 2023-01-04 | End: 2023-01-04

## 2023-01-04 RX ADMIN — PANTOPRAZOLE SODIUM 40 MG: 40 TABLET, DELAYED RELEASE ORAL at 08:57

## 2023-01-04 RX ADMIN — INSULIN LISPRO 2 UNITS: 100 INJECTION, SOLUTION INTRAVENOUS; SUBCUTANEOUS at 17:08

## 2023-01-04 RX ADMIN — CLOPIDOGREL BISULFATE 75 MG: 75 TABLET ORAL at 08:57

## 2023-01-04 RX ADMIN — HEPARIN SODIUM 5000 UNITS: 5000 INJECTION INTRAVENOUS; SUBCUTANEOUS at 14:30

## 2023-01-04 RX ADMIN — DONEPEZIL HYDROCHLORIDE 5 MG: 5 TABLET ORAL at 22:09

## 2023-01-04 RX ADMIN — POTASSIUM CHLORIDE 40 MEQ: 1500 TABLET, EXTENDED RELEASE ORAL at 09:04

## 2023-01-04 RX ADMIN — INSULIN LISPRO 2 UNITS: 100 INJECTION, SOLUTION INTRAVENOUS; SUBCUTANEOUS at 22:09

## 2023-01-04 RX ADMIN — ATORVASTATIN CALCIUM 20 MG: 20 TABLET, FILM COATED ORAL at 17:09

## 2023-01-04 RX ADMIN — HEPARIN SODIUM 5000 UNITS: 5000 INJECTION INTRAVENOUS; SUBCUTANEOUS at 22:09

## 2023-01-04 RX ADMIN — INSULIN LISPRO 1 UNITS: 100 INJECTION, SOLUTION INTRAVENOUS; SUBCUTANEOUS at 12:06

## 2023-01-04 RX ADMIN — ASPIRIN 81 MG: 81 TABLET ORAL at 08:57

## 2023-01-04 RX ADMIN — AMLODIPINE BESYLATE 5 MG: 5 TABLET ORAL at 08:57

## 2023-01-04 RX ADMIN — CARVEDILOL 3.12 MG: 3.12 TABLET, FILM COATED ORAL at 17:09

## 2023-01-04 RX ADMIN — CARVEDILOL 3.12 MG: 3.12 TABLET, FILM COATED ORAL at 09:04

## 2023-01-04 NOTE — ASSESSMENT & PLAN NOTE
Accurate ROS unable to be obtained from patient due to dementia  However he is currently without pain or other complaint  Per ED provider patient did have some complaints of dull generalized abdominal pain, sent in from outpatient care facility who reports patient has had increasing abdominal pain, distention, and decreased drainage from his percutaneous cholecystostomy tube  /85   Pulse 81   Temp 98 5 °F (36 9 °C)   Resp 19   Ht 5' 5" (1 651 m)   Wt 54 5 kg (120 lb 2 4 oz)   SpO2 98%   BMI 19 99 kg/m²     Had percutaneous cholecystostomy drain placed 12/23 at [address] [place] [address]  Outpatient care facility thought patient had increasing abd distention, pain, and decreased drain output   Patient currently denies any abd pain/complaint however has prior stroke and possible dementia hx     · CT showing some pericholecystic fluid/fat stranding  · Off antibiotics at this time, low suspicion for infection  · Pending ERCP today with GI

## 2023-01-04 NOTE — DISCHARGE INSTR - AVS FIRST PAGE
A referral was sent for general surgery so you may follow with [facility] for continued management of your cholecystomy drain

## 2023-01-04 NOTE — CASE MANAGEMENT
Case Management Progress Note    Patient name Yvette Wong  Location /-01 MRN [id number]  : 1945 Date 2023       LOS (days): 3  Geometric Mean LOS (GMLOS) (days): 2 50  Days to GMLOS:-0 6        OBJECTIVE:        Current admission status: Inpatient  Preferred Pharmacy: No Pharmacies Listed  Primary Care Provider: Chucky Mello DO    Primary Insurance: MEDICARE  Secondary Insurance: [address] [place] [address]    PROGRESS NOTE:  Notified by Nessa Both that pending D/C home postponed after IR eval - GI is now consulted  CM cancelled scheduled Round Trip transport for 4pm p/u  Caregiver Alal Holcomb updated to d/c home being postponed  She will notify Waiver program contact    Plan:  CM will moniter and coordinate D/C plan again when stable for D/C

## 2023-01-04 NOTE — BRIEF OP NOTE (RAD/CATH)
INTERVENTIONAL RADIOLOGY PROCEDURE NOTE    Date: 1/4/2023    Procedure: Cholecystostomy check  Procedure Summary     Date:  Room / Location:     Anesthesia Start:  Anesthesia Stop:     Procedure:  Diagnosis:     Scheduled Providers:  Responsible Provider:     Anesthesia Type: Not recorded ASA Status: Not recorded          Preoperative diagnosis:   1  Abdominal pain    2  Thickening of wall of gallbladder with pericholecystic fluid    3  Ascites    4  Cholelithiasis    5  Leukocytosis    6  Infection of cholecystostomy drain, initial encounter ([facility] )    7  Anemia    8  Dehydration    9  Renal insufficiency         Postoperative diagnosis: Same  Surgeon: Ruth Lanza DO     Assistant: None  No qualified resident was available  Blood loss: none    Specimens: none    Findings:Whittaker webster catheter in a contracted gallbladder filled with stones  Cystic duct patent  Common duct visualized with small filling defects near ampulla which are likely stones  Contrast is seen in the small bowel so common duct is patent but may intermittent obstruct  Catheter replaced to gravity bag drainage  Complications: None immediate      Anesthesia:none

## 2023-01-04 NOTE — PROGRESS NOTES
INTERNAL MEDICINE RESIDENCY PROGRESS NOTE     Name: Ana Dumont   Age & Sex: 69 y o  male   MRN: [id number]  Unit/Bed#: -01   Encounter: [id number]    PATIENT INFORMATION     Name: Ana Dumont   Age & Sex: 69 y o  male   MRN: [id number]  Hospital Stay Days: 3    ASSESSMENT/PLAN     Principal Problem:    Cholecystostomy tube dysfunction  Active Problems:    Benign essential hypertension    Hyperlipidemia    Type 2 diabetes mellitus without complication (HCC)    A-fib (HCC)    CKD (chronic kidney disease)    Dementia ([facility] )      * Cholecystostomy tube dysfunction  Assessment & Plan  Accurate ROS unable to be obtained from patient due to dementia  However he is currently without pain or other complaint  Per ED provider patient did have some complaints of dull generalized abdominal pain, sent in from outpatient care facility who reports patient has had increasing abdominal pain, distention, and decreased drainage from his percutaneous cholecystostomy tube  /85   Pulse 81   Temp 98 5 °F (36 9 °C)   Resp 19   Ht 5' 5" (1 651 m)   Wt 54 5 kg (120 lb 2 4 oz)   SpO2 98%   BMI 19 99 kg/m²     Had percutaneous cholecystostomy drain placed 12/23 at [address] [place] [address]  Outpatient care facility thought patient had increasing abd distention, pain, and decreased drain output   Patient currently denies any abd pain/complaint however has prior stroke and possible dementia hx     · CT showing some pericholecystic fluid/fat stranding  · Off antibiotics at this time, low suspicion for infection  · IR consulted to assess drain will assess drain, planned for January 4      Dementia [facility])  Assessment & Plan  · Able to answer questions and follow commands  · Continue home Aricept    CKD (chronic kidney disease)  Assessment & Plan  · Creatinine stable, unknown baseline, continue to trend daily    A-fib [facility])  Assessment & Plan  · Currently rate controlled  · Continue beta-blocker    Type 2 diabetes mellitus without complication (HCC)  Assessment & Plan  · Continue sliding scale insulin  · Hold home metformin, januvia    Hyperlipidemia  Assessment & Plan  · Continue home 20mg atorvastatin    Benign essential hypertension  Assessment & Plan  · BP controlled, continue with home Amlodipine and Coreg      Disposition: Pending IR evaluation     SUBJECTIVE     Patient seen and examined  No acute events overnight  Patient without acute complaints however ROS limited by dementia  OBJECTIVE     Vitals:    23 0804 23 1503 23 2311 23 0827   BP:  166/83 (!) 179/84 170/80   Pulse:  80 82 82   Resp:  16 16    Temp:   98 8 °F (37 1 °C)    TempSrc:       SpO2: 97% 97% 94% 97%   Weight:       Height:          Temperature:   Temp (24hrs), Av 8 °F (37 1 °C), Min:98 8 °F (37 1 °C), Max:98 8 °F (37 1 °C)    Temperature: 98 8 °F (37 1 °C)  Intake & Output:  I/O        07 07 07 07 07 0700    P  O  540 600     Total Intake(mL/kg) 540 (9 9) 600 (11)     Urine (mL/kg/hr) 2000 (1 5) 2750 (2 1)     Emesis/NG output 10 10     Total Output  2760     Net -1470 -2160            Unmeasured Stool Occurrence   1 x        Weights:   IBW (Ideal Body Weight): 61 5 kg    Body mass index is 19 99 kg/m²  Weight (last 2 days)     None        Physical Exam  LABORATORY DATA     Labs: I have personally reviewed pertinent reports    Results from last 7 days   Lab Units 23  0539 23  0519   WBC Thousand/uL 11 73* 10 53* 9 70   HEMOGLOBIN g/dL 8 4* 7 9* 7 8*   HEMATOCRIT % 27 6* 26 0* 25 5*   PLATELETS Thousands/uL 499* 436* 371   NEUTROS PCT % 83* 81* 85*   MONOS PCT % 5 6 6      Results from last 7 days   Lab Units 23  0539 23  0444 22  0519 22  0047   POTASSIUM mmol/L 3 3* 3 5 3 8 3 9   CHLORIDE mmol/L 104 106 108 105   CO2 mmol/L 28 29 27 26   BUN mg/dL 18 25 38* 39*   CREATININE mg/dL 1 28 1 66* 1 35* 1 40*   CALCIUM mg/dL 8 1* 7 8* 7  6* 7 8*   ALK PHOS U/L 525*  --  403* 460*   ALT U/L 15  --  9* 13   AST U/L 22  --  17 17                            IMAGING & DIAGNOSTIC TESTING     Radiology Results: I have personally reviewed pertinent reports  CT abdomen pelvis with contrast    Result Date: 12/31/2022  Impression: 1  Percutaneous cholecystostomy catheter seen with its pigtail in the collapsed gallbladder  There is cholelithiasis  There is pericholecystic fluid/fat stranding which is nonspecific in setting of small to moderate ascites  2   Mild diffuse thickening and hyperenhancement of the rectosigmoid colon which may be due to nonspecific colitis/proctitis  Gaseous distention of the transverse and descending colon  3   Mild diffuse thickening of the stomach wall may be due to gastritis  4   Small bilateral pleural effusions  5   Mild thickening of the urinary bladder wall which may be due to chronic bladder outlet obstruction, correlate with urinalysis to exclude cystitis  Workstation performed: [id number]     Other Diagnostic Testing: I have personally reviewed pertinent reports      ACTIVE MEDICATIONS     Current Facility-Administered Medications   Medication Dose Route Frequency   • acetaminophen (TYLENOL) tablet 650 mg  650 mg Oral Q6H PRN   • amLODIPine (NORVASC) tablet 5 mg  5 mg Oral Daily   • aspirin (ECOTRIN LOW STRENGTH) EC tablet 81 mg  81 mg Oral Daily   • atorvastatin (LIPITOR) tablet 20 mg  20 mg Oral QPM   • carvedilol (COREG) tablet 3 125 mg  3 125 mg Oral BID With Meals   • clopidogrel (PLAVIX) tablet 75 mg  75 mg Oral Daily   • donepezil (ARICEPT) tablet 5 mg  5 mg Oral HS   • heparin (porcine) subcutaneous injection 5,000 Units  5,000 Units Subcutaneous Q8H [address]   • insulin lispro (HumaLOG) 100 units/mL subcutaneous injection 1-5 Units  1-5 Units Subcutaneous 4x Daily (AC & HS)   • pantoprazole (PROTONIX) EC tablet 40 mg  40 mg Oral Daily       VTE Pharmacologic Prophylaxis: Heparin  VTE Mechanical Prophylaxis: sequential compression device    Portions of the record may have been created with voice recognition software  Occasional wrong word or "sound a like" substitutions may have occurred due to the inherent limitations of voice recognition software    Read the chart carefully and recognize, using context, where substitutions have occurred   ==  Jayro Martinez, [facility]  Internal Medicine Residency PGY-2

## 2023-01-04 NOTE — CONSULTS
Consultation - [address] [facility]  Unruly Kirby 69 y o  male MRN: [id number]  Unit/Bed#: -01 Encounter: [id number]         Reason for Consult / Principal Problem: Filling defects seen in the distal CBD during IR tube check     HPI: Mr Darrick Friedman is a 69year old male with history of dementia, HTN, HLD, type 2 DM, CKD stage 3 and atrial fibrillation on Plavix  Patient's history acquired by the EMR and patient's niece Jose Yao whom the patient has lived with for 11 years  Patient was recently hospitalized at [address] [place] [address] for pneumonia, COVID and cholecystitis  Patient's niece reports that they had decided to place cholecystostomy secondary to his pulmonary status  Patient went to rehab, and was brought here for continued reports of abdominal pain  During tube check today, known cholelithiasis noted as well as distal CBD filling defects seen  LFTs are as follows: AST 22, ALT 15, , total bilirubin 0 55  Slight leukocytosis of 11,730 with left shift  Patient seen this afternoon at the bedside following tube check  Patient reports no abdominal pain  Review of Systems: Unable to perform secondary to dementia  Historical Information   Past Medical History:   Diagnosis Date   • Diabetes mellitus ([facility] [address] )    • Hypertension    • Phlebitis    • Stroke [facility])      Past Surgical History:   Procedure Laterality Date   • ADENOIDECTOMY     • CHOLECYSTECTOMY     • IR CHOLECYSTOSTOMY TUBE CHECK/CHANGE/REPOSITION/REINSERTION/UPSIZE  1/4/2023     Social History   Social History     Substance and Sexual Activity   Alcohol Use Not Currently     Social History     Substance and Sexual Activity   Drug Use No     Social History     Tobacco Use   Smoking Status Never   • Passive exposure: Past   Smokeless Tobacco Never     No family history on file       Meds/Allergies     Current Facility-Administered Medications   Medication Dose Route Frequency   • acetaminophen (TYLENOL) tablet 650 mg  650 mg Oral Q6H PRN • amLODIPine (NORVASC) tablet 5 mg  5 mg Oral Daily   • aspirin (ECOTRIN LOW STRENGTH) EC tablet 81 mg  81 mg Oral Daily   • atorvastatin (LIPITOR) tablet 20 mg  20 mg Oral QPM   • carvedilol (COREG) tablet 3 125 mg  3 125 mg Oral BID With Meals   • clopidogrel (PLAVIX) tablet 75 mg  75 mg Oral Daily   • donepezil (ARICEPT) tablet 5 mg  5 mg Oral HS   • heparin (porcine) subcutaneous injection 5,000 Units  5,000 Units Subcutaneous Q8H [facility]   • insulin lispro (HumaLOG) 100 units/mL subcutaneous injection 1-5 Units  1-5 Units Subcutaneous 4x Daily (AC & HS)   • pantoprazole (PROTONIX) EC tablet 40 mg  40 mg Oral Daily       No Known Allergies      Objective     Blood pressure 163/78, pulse 82, temperature (!) 97 2 °F (36 2 °C), resp  rate 15, height 5' 5" (1 651 m), weight 54 5 kg (120 lb 2 4 oz), SpO2 97 %  Intake/Output Summary (Last 24 hours) at 1/4/2023 1552  Last data filed at 1/4/2023 1439  Gross per 24 hour   Intake 600 ml   Output 2250 ml   Net -1650 ml         PHYSICAL EXAM:      General Appearance:   Alert  Cooperative, and in no respiratory distress   HEENT:   Normocephalic, atraumatic, anicteric      Neck:  Supple, symmetrical, trachea midline   Lungs:   Clear to auscultation bilaterally; no rales, rhonchi or wheezing; respirations unlabored    Heart[de-identified]   S1 and S2 normal; regular rate and rhythm; no murmur, rub, or gallop     Abdomen:   Soft, non-tender, non-distended; normal bowel sounds; no masses, no organomegaly    Genitalia:   Deferred    Rectal:   Deferred    Extremities:  No cyanosis, clubbing or edema    Pulses:  2+ and symmetric all extremities    Skin:  Skin color, texture, turgor normal, no rashes or lesions    Lymph nodes:  No palpable cervical or supraclavicular lymphadenopathy        Lab Results:   Results from last 7 days   Lab Units 01/04/23  0539   WBC Thousand/uL 11 73*   HEMOGLOBIN g/dL 8 4*   HEMATOCRIT % 27 6*   PLATELETS Thousands/uL 499*   NEUTROS PCT % 83*   LYMPHS PCT % 10* MONOS PCT % 5   EOS PCT % 1     Results from last 7 days   Lab Units 01/04/23  0539   POTASSIUM mmol/L 3 3*   CHLORIDE mmol/L 104   CO2 mmol/L 28   BUN mg/dL 18   CREATININE mg/dL 1 28   CALCIUM mg/dL 8 1*   ALK PHOS U/L 525*   ALT U/L 15   AST U/L 22         Results from last 7 days   Lab Units 12/31/22  0047   LIPASE u/L 598*       Imaging Studies: I have personally reviewed pertinent imaging studies  CT abdomen pelvis with contrast    Result Date: 12/31/2022  Impression: 1  Percutaneous cholecystostomy catheter seen with its pigtail in the collapsed gallbladder  There is cholelithiasis  There is pericholecystic fluid/fat stranding which is nonspecific in setting of small to moderate ascites  2   Mild diffuse thickening and hyperenhancement of the rectosigmoid colon which may be due to nonspecific colitis/proctitis  Gaseous distention of the transverse and descending colon  3   Mild diffuse thickening of the stomach wall may be due to gastritis  4   Small bilateral pleural effusions  5   Mild thickening of the urinary bladder wall which may be due to chronic bladder outlet obstruction, correlate with urinalysis to exclude cystitis  Workstation performed: [id number]     IR cholecystostomy tube check/change/reposition/reinsertion/upsize    Result Date: 1/4/2023  Impression: Impression: Cholecystostomy in a contracted bladder filled with stones  Filling defects in the cystic and common duct suggestive for stones  Small bowel does opacify however in light of the filling defects in the common bile duct intermittent obstruction cannot be excluded  Workstation performed: [id number]       ASSESSMENT and PLAN:      1) Status post cholecystostomy placement, filling defects seen during IR tube check - Bilirubin is WNL  Spoke with patient's niece who has been making his medical decisions  Discussed IR findings during tube check  Explained indication for ERCP with patient's niece   She is in agreement, and would like us to proceed with intervention    - Will plan for ERCP tentatively on Friday  - Blood cultures  - If patient complains of pain or has new onset fever, start on IV Zosyn   - Continue to monitor LFTs   - Eventual cholecystectomy as an outpatient     The patient was seen and examined by Dr Morena Lange, all correa medical decisions were made with Dr Morena Lange  Thank you for allowing us to participate in the care of this pleasant patient  We will follow up with you closely

## 2023-01-05 LAB
ANION GAP SERPL CALCULATED.3IONS-SCNC: 7 MMOL/L (ref 4–13)
BACTERIA BLD CULT: NORMAL
BACTERIA BLD CULT: NORMAL
BASOPHILS # BLD AUTO: 0.05 THOUSANDS/ÂΜL (ref 0–0.1)
BASOPHILS NFR BLD AUTO: 1 % (ref 0–1)
BUN SERPL-MCNC: 20 MG/DL (ref 5–25)
CALCIUM SERPL-MCNC: 8.2 MG/DL (ref 8.3–10.1)
CHLORIDE SERPL-SCNC: 103 MMOL/L (ref 96–108)
CO2 SERPL-SCNC: 29 MMOL/L (ref 21–32)
CREAT SERPL-MCNC: 1.44 MG/DL (ref 0.6–1.3)
EOSINOPHIL # BLD AUTO: 0.12 THOUSAND/ÂΜL (ref 0–0.61)
EOSINOPHIL NFR BLD AUTO: 1 % (ref 0–6)
ERYTHROCYTE [DISTWIDTH] IN BLOOD BY AUTOMATED COUNT: 17.3 % (ref 11.6–15.1)
GFR SERPL CREATININE-BSD FRML MDRD: 46 ML/MIN/1.73SQ M
GLUCOSE SERPL-MCNC: 142 MG/DL (ref 65–140)
GLUCOSE SERPL-MCNC: 148 MG/DL (ref 65–140)
GLUCOSE SERPL-MCNC: 174 MG/DL (ref 65–140)
GLUCOSE SERPL-MCNC: 245 MG/DL (ref 65–140)
GLUCOSE SERPL-MCNC: 273 MG/DL (ref 65–140)
GLUCOSE SERPL-MCNC: 320 MG/DL (ref 65–140)
HCT VFR BLD AUTO: 28.1 % (ref 36.5–49.3)
HGB BLD-MCNC: 8.3 G/DL (ref 12–17)
IMM GRANULOCYTES # BLD AUTO: 0.03 THOUSAND/UL (ref 0–0.2)
IMM GRANULOCYTES NFR BLD AUTO: 0 % (ref 0–2)
LYMPHOCYTES # BLD AUTO: 1.33 THOUSANDS/ÂΜL (ref 0.6–4.47)
LYMPHOCYTES NFR BLD AUTO: 14 % (ref 14–44)
MCH RBC QN AUTO: 26.2 PG (ref 26.8–34.3)
MCHC RBC AUTO-ENTMCNC: 29.5 G/DL (ref 31.4–37.4)
MCV RBC AUTO: 89 FL (ref 82–98)
MONOCYTES # BLD AUTO: 0.5 THOUSAND/ÂΜL (ref 0.17–1.22)
MONOCYTES NFR BLD AUTO: 5 % (ref 4–12)
NEUTROPHILS # BLD AUTO: 7.25 THOUSANDS/ÂΜL (ref 1.85–7.62)
NEUTS SEG NFR BLD AUTO: 79 % (ref 43–75)
NRBC BLD AUTO-RTO: 0 /100 WBCS
PLATELET # BLD AUTO: 511 THOUSANDS/UL (ref 149–390)
PMV BLD AUTO: 9.9 FL (ref 8.9–12.7)
POTASSIUM SERPL-SCNC: 4.2 MMOL/L (ref 3.5–5.3)
RBC # BLD AUTO: 3.17 MILLION/UL (ref 3.88–5.62)
SODIUM SERPL-SCNC: 139 MMOL/L (ref 135–147)
WBC # BLD AUTO: 9.28 THOUSAND/UL (ref 4.31–10.16)

## 2023-01-05 RX ORDER — SODIUM CHLORIDE, SODIUM LACTATE, POTASSIUM CHLORIDE, CALCIUM CHLORIDE 600; 310; 30; 20 MG/100ML; MG/100ML; MG/100ML; MG/100ML
125 INJECTION, SOLUTION INTRAVENOUS CONTINUOUS
Status: CANCELLED | OUTPATIENT
Start: 2023-01-05

## 2023-01-05 RX ADMIN — HEPARIN SODIUM 5000 UNITS: 5000 INJECTION INTRAVENOUS; SUBCUTANEOUS at 14:53

## 2023-01-05 RX ADMIN — DONEPEZIL HYDROCHLORIDE 5 MG: 5 TABLET ORAL at 22:44

## 2023-01-05 RX ADMIN — CARVEDILOL 3.12 MG: 3.12 TABLET, FILM COATED ORAL at 16:45

## 2023-01-05 RX ADMIN — ASPIRIN 81 MG: 81 TABLET ORAL at 09:37

## 2023-01-05 RX ADMIN — PANTOPRAZOLE SODIUM 40 MG: 40 TABLET, DELAYED RELEASE ORAL at 09:37

## 2023-01-05 RX ADMIN — INSULIN LISPRO 1 UNITS: 100 INJECTION, SOLUTION INTRAVENOUS; SUBCUTANEOUS at 22:44

## 2023-01-05 RX ADMIN — ATORVASTATIN CALCIUM 20 MG: 20 TABLET, FILM COATED ORAL at 16:46

## 2023-01-05 RX ADMIN — INSULIN LISPRO 3 UNITS: 100 INJECTION, SOLUTION INTRAVENOUS; SUBCUTANEOUS at 12:00

## 2023-01-05 RX ADMIN — INSULIN LISPRO 2 UNITS: 100 INJECTION, SOLUTION INTRAVENOUS; SUBCUTANEOUS at 16:46

## 2023-01-05 RX ADMIN — HEPARIN SODIUM 5000 UNITS: 5000 INJECTION INTRAVENOUS; SUBCUTANEOUS at 05:28

## 2023-01-05 RX ADMIN — CARVEDILOL 3.12 MG: 3.12 TABLET, FILM COATED ORAL at 09:37

## 2023-01-05 RX ADMIN — HEPARIN SODIUM 5000 UNITS: 5000 INJECTION INTRAVENOUS; SUBCUTANEOUS at 22:44

## 2023-01-05 RX ADMIN — AMLODIPINE BESYLATE 5 MG: 5 TABLET ORAL at 09:37

## 2023-01-05 NOTE — PROGRESS NOTES
INTERNAL MEDICINE RESIDENCY PROGRESS NOTE     Name: Micaela Alfaro   Age & Sex: 69 y o  male   MRN: [id number]  Unit/Bed#: -Domingo   Encounter: [id number]    PATIENT INFORMATION     Name: Micaela Alfaro   Age & Sex: 69 y o  male   MRN: [id number]  Hospital Stay Days: 4    ASSESSMENT/PLAN     Principal Problem:    Cholecystostomy tube dysfunction  Active Problems:    Benign essential hypertension    Hyperlipidemia    Type 2 diabetes mellitus without complication (HCC)    A-fib (HCC)    CKD (chronic kidney disease)    [facility] )    Choledocholithiasis      * Cholecystostomy tube dysfunction  Assessment & Plan  Accurate ROS unable to be obtained from patient due to dementia  However he is currently without pain or other complaint  Per ED provider patient did have some complaints of dull generalized abdominal pain, sent in from outpatient care facility who reports patient has had increasing abdominal pain, distention, and decreased drainage from his percutaneous cholecystostomy tube  /85   Pulse 81   Temp 98 5 °F (36 9 °C)   Resp 19   Ht 5' 5" (1 651 m)   Wt 54 5 kg (120 lb 2 4 oz)   SpO2 98%   BMI 19 99 kg/m²     Had percutaneous cholecystostomy drain placed 12/23 at [address] [place] [address]  Outpatient care facility thought patient had increasing abd distention, pain, and decreased drain output   Patient currently denies any abd pain/complaint however has prior stroke and possible dementia hx     · CT showing some pericholecystic fluid/fat stranding  · Off antibiotics at this time, low suspicion for infection  · IR consulted to assess drain will assess drain, planned for January 4      Dementia [facility])  Assessment & Plan  · Able to answer questions and follow commands  · Continue home Aricept    CKD (chronic kidney disease)  Assessment & Plan  · Creatinine stable, unknown baseline, continue to trend daily    A-fib [facility])  Assessment & Plan  · Currently rate controlled  · Continue beta-blocker    Type 2 diabetes mellitus without complication (HCC)  Assessment & Plan  · Continue sliding scale insulin  · Hold home metformin, januvia    Hyperlipidemia  Assessment & Plan  · Continue home 20mg atorvastatin    Benign essential hypertension  Assessment & Plan  · BP controlled, continue with home Amlodipine and Coreg      Disposition: Pending ERCP tomorrow, dispo dependent on findings     SUBJECTIVE     Patient seen and examined  No acute events overnight  No acute complaints  Resting comfortably  OBJECTIVE     Vitals:    23 0713 23 0743 23 1458 23 1522   BP: 144/72  137/72 135/69   BP Location:       Pulse: 78  88 82   Resp:       Temp: 98 2 °F (36 8 °C)   98 5 °F (36 9 °C)   TempSrc:       SpO2: 99% 97% 99% 98%   Weight:       Height:          Temperature:   Temp (24hrs), Av 3 °F (36 8 °C), Min:98 2 °F (36 8 °C), Max:98 5 °F (36 9 °C)    Temperature: 98 5 °F (36 9 °C)  Intake & Output:  I/O        0701  / 0700  0701   0700  0701   0700    P  O  600 600 480    Total Intake(mL/kg) 600 (11) 600 (11) 480 (8 8)    Urine (mL/kg/hr) 2750 (2 1) 1350 (1) 550 (1 1)    Emesis/NG output 10 0 0    Stool  0 0    Total Output 2760 1350 550    Net -2160 -750 -70           Unmeasured Urine Occurrence   2 x    Unmeasured Stool Occurrence  1 x 0 x        Weights:   IBW (Ideal Body Weight): 61 5 kg    Body mass index is 19 99 kg/m²  Weight (last 2 days)     None        Physical Exam  Vitals reviewed  Constitutional:       General: He is not in acute distress  Appearance: Normal appearance  He is not ill-appearing  HENT:      Head: Normocephalic and atraumatic  Eyes:      Conjunctiva/sclera: Conjunctivae normal    Cardiovascular:      Rate and Rhythm: Normal rate and regular rhythm  Pulses: Normal pulses  Heart sounds: Normal heart sounds  Pulmonary:      Effort: Pulmonary effort is normal  No respiratory distress        Breath sounds: Normal breath sounds  No wheezing, rhonchi or rales  Abdominal:      General: Abdomen is flat  Bowel sounds are normal  There is no distension  Palpations: Abdomen is soft  Tenderness: There is no abdominal tenderness  There is no guarding  Musculoskeletal:         General: No swelling  Right lower leg: No edema  Left lower leg: No edema  Skin:     General: Skin is warm  Capillary Refill: Capillary refill takes less than 2 seconds  Coloration: Skin is not jaundiced  Neurological:      Mental Status: He is alert  Mental status is at baseline  LABORATORY DATA     Labs: I have personally reviewed pertinent reports  Results from last 7 days   Lab Units 01/05/23 0437 01/04/23  0539 01/03/23  0444   WBC Thousand/uL 9 28 11 73* 10 53*   HEMOGLOBIN g/dL 8 3* 8 4* 7 9*   HEMATOCRIT % 28 1* 27 6* 26 0*   PLATELETS Thousands/uL 511* 499* 436*   NEUTROS PCT % 79* 83* 81*   MONOS PCT % 5 5 6      Results from last 7 days   Lab Units 01/05/23 0437 01/04/23  0539 01/03/23  0444 12/31/22  0519 12/31/22  0047   POTASSIUM mmol/L 4 2 3 3* 3 5 3 8 3 9   CHLORIDE mmol/L 103 104 106 108 105   CO2 mmol/L 29 28 29 27 26   BUN mg/dL 20 18 25 38* 39*   CREATININE mg/dL 1 44* 1 28 1 66* 1 35* 1 40*   CALCIUM mg/dL 8 2* 8 1* 7 8* 7 6* 7 8*   ALK PHOS U/L  --  525*  --  403* 460*   ALT U/L  --  15  --  9* 13   AST U/L  --  22  --  17 17                            IMAGING & DIAGNOSTIC TESTING     Radiology Results: I have personally reviewed pertinent reports  CT abdomen pelvis with contrast    Result Date: 12/31/2022  Impression: 1  Percutaneous cholecystostomy catheter seen with its pigtail in the collapsed gallbladder  There is cholelithiasis  There is pericholecystic fluid/fat stranding which is nonspecific in setting of small to moderate ascites  2   Mild diffuse thickening and hyperenhancement of the rectosigmoid colon which may be due to nonspecific colitis/proctitis    Gaseous distention of the transverse and descending colon  3   Mild diffuse thickening of the stomach wall may be due to gastritis  4   Small bilateral pleural effusions  5   Mild thickening of the urinary bladder wall which may be due to chronic bladder outlet obstruction, correlate with urinalysis to exclude cystitis  Workstation performed: [id number]     IR cholecystostomy tube check/change/reposition/reinsertion/upsize    Result Date: 1/4/2023  Impression: Impression: Cholecystostomy in a contracted bladder filled with stones  Filling defects in the cystic and common duct suggestive for stones  Small bowel does opacify however in light of the filling defects in the common bile duct intermittent obstruction cannot be excluded  Workstation performed: [id number]     Other Diagnostic Testing: I have personally reviewed pertinent reports  ACTIVE MEDICATIONS     Current Facility-Administered Medications   Medication Dose Route Frequency   • acetaminophen (TYLENOL) tablet 650 mg  650 mg Oral Q6H PRN   • amLODIPine (NORVASC) tablet 5 mg  5 mg Oral Daily   • aspirin (ECOTRIN LOW STRENGTH) EC tablet 81 mg  81 mg Oral Daily   • atorvastatin (LIPITOR) tablet 20 mg  20 mg Oral QPM   • carvedilol (COREG) tablet 3 125 mg  3 125 mg Oral BID With Meals   • donepezil (ARICEPT) tablet 5 mg  5 mg Oral HS   • heparin (porcine) subcutaneous injection 5,000 Units  5,000 Units Subcutaneous Q8H [address]   • insulin lispro (HumaLOG) 100 units/mL subcutaneous injection 1-5 Units  1-5 Units Subcutaneous 4x Daily (AC & HS)   • pantoprazole (PROTONIX) EC tablet 40 mg  40 mg Oral Daily       VTE Pharmacologic Prophylaxis: Heparin  VTE Mechanical Prophylaxis: sequential compression device    Portions of the record may have been created with voice recognition software  Occasional wrong word or "sound a like" substitutions may have occurred due to the inherent limitations of voice recognition software    Read the chart carefully and recognize, using context, where substitutions have occurred   ==  Charo Quinteros, [facility]  Internal Medicine Residency PGY-2

## 2023-01-05 NOTE — PROGRESS NOTES
Progress Note - Unruly Kirby 69 y o  male MRN: [id number]    Unit/Bed#: -01 Encounter: [id number]        Subjective:     Patient reportedly eating well today per nursing  He does not appear to be in distress  Patient can be combative at times  Vitals appear stable, no fever or hypotension  ROS: As noted in the HPI, otherwise all others negative  Objective:     Vitals: Blood pressure 135/69, pulse 82, temperature 98 5 °F (36 9 °C), resp  rate 16, height 5' 5" (1 651 m), weight 54 5 kg (120 lb 2 4 oz), SpO2 98 %  ,Body mass index is 19 99 kg/m²  Intake/Output Summary (Last 24 hours) at 1/5/2023 1624  Last data filed at 1/5/2023 1501  Gross per 24 hour   Intake 840 ml   Output 1200 ml   Net -360 ml       Physical Exam:     General Appearance: Alert  In no respiratory distress  Lungs: Clear to auscultation bilaterally, no rales or rhonchi  Heart: Regular rate and rhythm, S1, S2 normal, no murmur, click, rub or gallop  Abdomen: Soft, non-tender, non-distended; bowel sounds normal; no masses or no organomegaly  Extremities: No cyanosis, edema    Invasive Devices     Peripheral Intravenous Line  Duration           Peripheral IV 01/01/23 Dorsal (posterior); Left Hand 3 days          Drain  Duration           Biliary Tube 4 days    External Urinary Catheter Medium 4 days                Lab Results:  Results from last 7 days   Lab Units 01/05/23  0437   WBC Thousand/uL 9 28   HEMOGLOBIN g/dL 8 3*   HEMATOCRIT % 28 1*   PLATELETS Thousands/uL 511*   NEUTROS PCT % 79*   LYMPHS PCT % 14   MONOS PCT % 5   EOS PCT % 1     Results from last 7 days   Lab Units 01/05/23  0437 01/04/23  0539   POTASSIUM mmol/L 4 2 3 3*   CHLORIDE mmol/L 103 104   CO2 mmol/L 29 28   BUN mg/dL 20 18   CREATININE mg/dL 1 44* 1 28   CALCIUM mg/dL 8 2* 8 1*   ALK PHOS U/L  --  525*   ALT U/L  --  15   AST U/L  --  22         Results from last 7 days   Lab Units 12/31/22  0047   LIPASE u/L 598*       Imaging Studies: I have personally reviewed pertinent imaging studies  CT abdomen pelvis with contrast    Result Date: 12/31/2022  Impression: 1  Percutaneous cholecystostomy catheter seen with its pigtail in the collapsed gallbladder  There is cholelithiasis  There is pericholecystic fluid/fat stranding which is nonspecific in setting of small to moderate ascites  2   Mild diffuse thickening and hyperenhancement of the rectosigmoid colon which may be due to nonspecific colitis/proctitis  Gaseous distention of the transverse and descending colon  3   Mild diffuse thickening of the stomach wall may be due to gastritis  4   Small bilateral pleural effusions  5   Mild thickening of the urinary bladder wall which may be due to chronic bladder outlet obstruction, correlate with urinalysis to exclude cystitis  Workstation performed: [id number]     IR cholecystostomy tube check/change/reposition/reinsertion/upsize    Result Date: 1/4/2023  Impression: Impression: Cholecystostomy in a contracted bladder filled with stones  Filling defects in the cystic and common duct suggestive for stones  Small bowel does opacify however in light of the filling defects in the common bile duct intermittent obstruction cannot be excluded  Workstation performed: [id number]         Assessment and Plan:     1) Status post cholecystostomy placement, filling defects seen during IR tube check - Bilirubin is WNL  Spoke with patient's niece who has been making his medical decisions  Discussed IR findings during tube check  Explained indication for ERCP with patient's niece   She is in agreement, and would like us to proceed with intervention    - Plan for ERCP tentatively on Friday  - Blood cultures in progress  - If patient complains of pain or has new onset fever, start on IV Zosyn   - Continue to monitor LFTs   - Eventual cholecystectomy as an outpatient

## 2023-01-06 ENCOUNTER — APPOINTMENT (INPATIENT)
Dept: RADIOLOGY | Facility: HOSPITAL | Age: 78
End: 2023-01-06

## 2023-01-06 ENCOUNTER — ANESTHESIA (INPATIENT)
Dept: PERIOP | Facility: HOSPITAL | Age: 78
End: 2023-01-06

## 2023-01-06 ENCOUNTER — APPOINTMENT (OUTPATIENT)
Dept: PERIOP | Facility: HOSPITAL | Age: 78
End: 2023-01-06

## 2023-01-06 ENCOUNTER — ANESTHESIA EVENT (INPATIENT)
Dept: PERIOP | Facility: HOSPITAL | Age: 78
End: 2023-01-06

## 2023-01-06 LAB
ANION GAP SERPL CALCULATED.3IONS-SCNC: 8 MMOL/L (ref 4–13)
BUN SERPL-MCNC: 24 MG/DL (ref 5–25)
CALCIUM SERPL-MCNC: 8.1 MG/DL (ref 8.3–10.1)
CHLORIDE SERPL-SCNC: 103 MMOL/L (ref 96–108)
CO2 SERPL-SCNC: 27 MMOL/L (ref 21–32)
CREAT SERPL-MCNC: 1.29 MG/DL (ref 0.6–1.3)
ERYTHROCYTE [DISTWIDTH] IN BLOOD BY AUTOMATED COUNT: 17.3 % (ref 11.6–15.1)
GFR SERPL CREATININE-BSD FRML MDRD: 53 ML/MIN/1.73SQ M
GLUCOSE SERPL-MCNC: 125 MG/DL (ref 65–140)
GLUCOSE SERPL-MCNC: 128 MG/DL (ref 65–140)
GLUCOSE SERPL-MCNC: 130 MG/DL (ref 65–140)
GLUCOSE SERPL-MCNC: 243 MG/DL (ref 65–140)
GLUCOSE SERPL-MCNC: 258 MG/DL (ref 65–140)
HCT VFR BLD AUTO: 28.4 % (ref 36.5–49.3)
HGB BLD-MCNC: 8.7 G/DL (ref 12–17)
MCH RBC QN AUTO: 26.9 PG (ref 26.8–34.3)
MCHC RBC AUTO-ENTMCNC: 30.6 G/DL (ref 31.4–37.4)
MCV RBC AUTO: 88 FL (ref 82–98)
PLATELET # BLD AUTO: 453 THOUSANDS/UL (ref 149–390)
PMV BLD AUTO: 9.6 FL (ref 8.9–12.7)
POTASSIUM SERPL-SCNC: 3.6 MMOL/L (ref 3.5–5.3)
POTASSIUM SERPL-SCNC: 5.7 MMOL/L (ref 3.5–5.3)
RBC # BLD AUTO: 3.23 MILLION/UL (ref 3.88–5.62)
SODIUM SERPL-SCNC: 138 MMOL/L (ref 135–147)
WBC # BLD AUTO: 8.23 THOUSAND/UL (ref 4.31–10.16)

## 2023-01-06 PROCEDURE — BF101ZZ FLUOROSCOPY OF BILE DUCTS USING LOW OSMOLAR CONTRAST: ICD-10-PCS | Performed by: INTERNAL MEDICINE

## 2023-01-06 PROCEDURE — 0FC98ZZ EXTIRPATION OF MATTER FROM COMMON BILE DUCT, VIA NATURAL OR ARTIFICIAL OPENING ENDOSCOPIC: ICD-10-PCS | Performed by: INTERNAL MEDICINE

## 2023-01-06 PROCEDURE — 0F798ZZ DILATION OF COMMON BILE DUCT, VIA NATURAL OR ARTIFICIAL OPENING ENDOSCOPIC: ICD-10-PCS | Performed by: INTERNAL MEDICINE

## 2023-01-06 RX ORDER — ONDANSETRON 2 MG/ML
4 INJECTION INTRAMUSCULAR; INTRAVENOUS ONCE AS NEEDED
Status: DISCONTINUED | OUTPATIENT
Start: 2023-01-06 | End: 2023-01-06 | Stop reason: HOSPADM

## 2023-01-06 RX ORDER — SODIUM CHLORIDE, SODIUM LACTATE, POTASSIUM CHLORIDE, CALCIUM CHLORIDE 600; 310; 30; 20 MG/100ML; MG/100ML; MG/100ML; MG/100ML
125 INJECTION, SOLUTION INTRAVENOUS CONTINUOUS
Status: DISCONTINUED | OUTPATIENT
Start: 2023-01-06 | End: 2023-01-07 | Stop reason: HOSPADM

## 2023-01-06 RX ORDER — LIDOCAINE HYDROCHLORIDE 10 MG/ML
INJECTION, SOLUTION EPIDURAL; INFILTRATION; INTRACAUDAL; PERINEURAL AS NEEDED
Status: DISCONTINUED | OUTPATIENT
Start: 2023-01-06 | End: 2023-01-06

## 2023-01-06 RX ORDER — SODIUM CHLORIDE, SODIUM LACTATE, POTASSIUM CHLORIDE, CALCIUM CHLORIDE 600; 310; 30; 20 MG/100ML; MG/100ML; MG/100ML; MG/100ML
INJECTION, SOLUTION INTRAVENOUS CONTINUOUS PRN
Status: DISCONTINUED | OUTPATIENT
Start: 2023-01-06 | End: 2023-01-06

## 2023-01-06 RX ORDER — PROPOFOL 10 MG/ML
INJECTION, EMULSION INTRAVENOUS AS NEEDED
Status: DISCONTINUED | OUTPATIENT
Start: 2023-01-06 | End: 2023-01-06

## 2023-01-06 RX ORDER — SODIUM CHLORIDE, SODIUM LACTATE, POTASSIUM CHLORIDE, CALCIUM CHLORIDE 600; 310; 30; 20 MG/100ML; MG/100ML; MG/100ML; MG/100ML
20 INJECTION, SOLUTION INTRAVENOUS CONTINUOUS
Status: DISCONTINUED | OUTPATIENT
Start: 2023-01-06 | End: 2023-01-07 | Stop reason: HOSPADM

## 2023-01-06 RX ORDER — ONDANSETRON 2 MG/ML
INJECTION INTRAMUSCULAR; INTRAVENOUS AS NEEDED
Status: DISCONTINUED | OUTPATIENT
Start: 2023-01-06 | End: 2023-01-06

## 2023-01-06 RX ORDER — DEXAMETHASONE SODIUM PHOSPHATE 10 MG/ML
INJECTION, SOLUTION INTRAMUSCULAR; INTRAVENOUS AS NEEDED
Status: DISCONTINUED | OUTPATIENT
Start: 2023-01-06 | End: 2023-01-06

## 2023-01-06 RX ORDER — SUCCINYLCHOLINE/SOD CL,ISO/PF 100 MG/5ML
SYRINGE (ML) INTRAVENOUS AS NEEDED
Status: DISCONTINUED | OUTPATIENT
Start: 2023-01-06 | End: 2023-01-06

## 2023-01-06 RX ADMIN — ASPIRIN 81 MG: 81 TABLET ORAL at 16:32

## 2023-01-06 RX ADMIN — HEPARIN SODIUM 5000 UNITS: 5000 INJECTION INTRAVENOUS; SUBCUTANEOUS at 05:59

## 2023-01-06 RX ADMIN — SODIUM CHLORIDE, SODIUM LACTATE, POTASSIUM CHLORIDE, AND CALCIUM CHLORIDE: .6; .31; .03; .02 INJECTION, SOLUTION INTRAVENOUS at 14:23

## 2023-01-06 RX ADMIN — HEPARIN SODIUM 5000 UNITS: 5000 INJECTION INTRAVENOUS; SUBCUTANEOUS at 16:32

## 2023-01-06 RX ADMIN — CARVEDILOL 3.12 MG: 3.12 TABLET, FILM COATED ORAL at 16:32

## 2023-01-06 RX ADMIN — INSULIN LISPRO 2 UNITS: 100 INJECTION, SOLUTION INTRAVENOUS; SUBCUTANEOUS at 22:12

## 2023-01-06 RX ADMIN — AMLODIPINE BESYLATE 5 MG: 5 TABLET ORAL at 09:23

## 2023-01-06 RX ADMIN — Medication 100 MG: at 14:38

## 2023-01-06 RX ADMIN — INSULIN LISPRO 2 UNITS: 100 INJECTION, SOLUTION INTRAVENOUS; SUBCUTANEOUS at 11:38

## 2023-01-06 RX ADMIN — CARVEDILOL 3.12 MG: 3.12 TABLET, FILM COATED ORAL at 09:24

## 2023-01-06 RX ADMIN — HEPARIN SODIUM 5000 UNITS: 5000 INJECTION INTRAVENOUS; SUBCUTANEOUS at 22:12

## 2023-01-06 RX ADMIN — DONEPEZIL HYDROCHLORIDE 5 MG: 5 TABLET ORAL at 22:11

## 2023-01-06 RX ADMIN — LIDOCAINE HYDROCHLORIDE 50 MG: 10 INJECTION, SOLUTION EPIDURAL; INFILTRATION; INTRACAUDAL at 14:37

## 2023-01-06 RX ADMIN — ONDANSETRON 4 MG: 2 INJECTION INTRAMUSCULAR; INTRAVENOUS at 15:12

## 2023-01-06 RX ADMIN — PROPOFOL 70 MG: 10 INJECTION, EMULSION INTRAVENOUS at 14:37

## 2023-01-06 RX ADMIN — PANTOPRAZOLE SODIUM 40 MG: 40 TABLET, DELAYED RELEASE ORAL at 09:23

## 2023-01-06 RX ADMIN — ATORVASTATIN CALCIUM 20 MG: 20 TABLET, FILM COATED ORAL at 18:00

## 2023-01-06 RX ADMIN — DEXAMETHASONE SODIUM PHOSPHATE 10 MG: 10 INJECTION, SOLUTION INTRAMUSCULAR; INTRAVENOUS at 14:41

## 2023-01-06 NOTE — ANESTHESIA POSTPROCEDURE EVALUATION
Post-Op Assessment Note    CV Status:  Stable  Pain Score: 0    Pain management: adequate     Mental Status:  Alert, awake and confused   Hydration Status:  Euvolemic   PONV Controlled:  Controlled   Airway Patency:  Patent      Post Op Vitals Reviewed: Yes      Staff: CRNA         No notable events documented      /66 (01/06/23 1516)    Temp 97 7 °F (36 5 °C) (01/06/23 1516)    Pulse 71 (01/06/23 1516)   Resp 18 (01/06/23 1516)    SpO2 100 % (01/06/23 1516) Admission

## 2023-01-06 NOTE — CASE MANAGEMENT
Case Management Assessment & Discharge Planning Note    Patient name Corina Reasons  Location /-01 MRN [id number]  : 1945 Date 2023       Current Admission Date: 2022  Current Admission Diagnosis:Cholecystostomy tube dysfunction   Patient Active Problem List    Diagnosis Date Noted   • Choledocholithiasis 2023   • Cholecystostomy tube dysfunction 2023   • A-fib ([facility] [address] ) 2022   • CKD (chronic kidney disease) 2022   • Dementia ([address] ) 2022   • Cholecystostomy drain infection ([facility] [address] ) 2022   • Gait abnormality 2022   • Language barrier 2022   • Asymptomatic proteinuria 2018   • Iron deficiency anemia 2018   • Benign essential hypertension 2016   • Calcification of coronary artery 2016   • Hyperlipidemia 2016   • Type 2 diabetes mellitus without complication ([facility] [address] )       LOS (days): 5  Geometric Mean LOS (GMLOS) (days): 3 80  Days to GMLOS:-1 4     OBJECTIVE:    Risk of Unplanned Readmission Score: 14 22         Current admission status: Inpatient       Preferred Pharmacy: No Pharmacies Listed  Primary Care Provider: Melecio Maldonado DO    Primary Insurance: MEDICARE  Secondary Insurance: Vivien Oconnor    ASSESSMENT:  Active Health Care Proxies    There are no active Health Care Proxies on file  DISCHARGE DETAILS:    Discharge planning discussed with[de-identified] Carmelita Cardoza of Choice: Yes  Comments - Freedom of Choice: Patient now schedued for d/c home   Guy Lu aware and agreeable and will alert both the agency for Waiver services as well as [address] provider [address]  CM updated [facility] to D/C plan in [address]    Request put in for BLS-1pm -pending  CM contacted family/caregiver?: Yes  Were Treatment Team discharge recommendations reviewed with patient/caregiver?: Yes  Did patient/caregiver verbalize understanding of patient care needs?: Yes Contacts  Contact Method: Phone  Reason/Outcome: Continuity of [address]         Is the patient interested in [facility] at discharge?: Yes  [address] requested[de-identified] Occupational Therapy, Physical Therapy, [address] Name[de-identified] [facility]  [address] Provider[de-identified] PCP    DME Referral Provided  Referral made for DME?: No    Other Referral/Resources/Interventions Provided:  Referral Comments: PATIENT WILL Bassem [address] [place] [address]'S [facility] 1/7    Treatment Team Recommendation: Short Term Rehab  Discharge Destination Plan[de-identified] Home with [place] at Discharge : BLS Ambulance  Dispatcher Contacted: Yes (REFERRED TO [facility] FOR 1/7 1PM BLS TRANSPORT)     ETA of Transport (Date): 01/07/23  ETA of Transport (Time): 0100

## 2023-01-06 NOTE — PROGRESS NOTES
INTERNAL MEDICINE RESIDENCY PROGRESS NOTE     Name: Ana Dumont   Age & Sex: 69 y o  male   MRN: [id number]  Unit/Bed#: -01   Encounter: [id number]    PATIENT INFORMATION     Name: Ana Dumont   Age & Sex: 69 y o  male   MRN: [id number]  Hospital Stay Days: 5    ASSESSMENT/PLAN     Principal Problem:    Cholecystostomy tube dysfunction  Active Problems:    Benign essential hypertension    Hyperlipidemia    Type 2 diabetes mellitus without complication (HCC)    A-fib (HCC)    CKD (chronic kidney disease)    Dementia ([place] [address] )    Choledocholithiasis      * Cholecystostomy tube dysfunction  Assessment & Plan  Accurate ROS unable to be obtained from patient due to dementia  However he is currently without pain or other complaint  Per ED provider patient did have some complaints of dull generalized abdominal pain, sent in from outpatient care facility who reports patient has had increasing abdominal pain, distention, and decreased drainage from his percutaneous cholecystostomy tube  /85   Pulse 81   Temp 98 5 °F (36 9 °C)   Resp 19   Ht 5' 5" (1 651 m)   Wt 54 5 kg (120 lb 2 4 oz)   SpO2 98%   BMI 19 99 kg/m²     Had percutaneous cholecystostomy drain placed 12/23 at [address] [place] [address]  Outpatient care facility thought patient had increasing abd distention, pain, and decreased drain output   Patient currently denies any abd pain/complaint however has prior stroke and possible dementia hx     · CT showing some pericholecystic fluid/fat stranding  · Off antibiotics at this time, low suspicion for infection  · Pending ERCP today with GI      Dementia ([place] [address] )  Assessment & Plan  · Able to answer questions and follow commands  · Continue home Aricept    CKD (chronic kidney disease)  Assessment & Plan  · Creatinine stable, unknown baseline, continue to trend daily    A-fib [facility])  Assessment & Plan  · Currently rate controlled  · Continue beta-blocker    Type 2 diabetes mellitus without complication [facility])  Assessment & Plan  · Continue sliding scale insulin  · Hold home metformin, januvia    Hyperlipidemia  Assessment & Plan  · Continue home 20mg atorvastatin    Benign essential hypertension  Assessment & Plan  · BP controlled, continue with home Amlodipine and Coreg      Disposition: Pending ERCP, possible discharge after depending on results    SUBJECTIVE     Patient seen and examined  No acute events overnight  Patient without acute complaints  Limited ROS due to dementia  OBJECTIVE     Vitals:    23 1522 23 2223 23 0723 23 0800   BP: 135/69 150/80 149/61    BP Location:   Right arm    Pulse: 82 82 75    Resp:  18 18    Temp: 98 5 °F (36 9 °C) 98 6 °F (37 °C) (!) 97 3 °F (36 3 °C)    TempSrc:   Oral    SpO2: 98% 98% 96% 96%   Weight:       Height:          Temperature:   Temp (24hrs), Av 1 °F (36 7 °C), Min:97 3 °F (36 3 °C), Max:98 6 °F (37 °C)    Temperature: (!) 97 3 °F (36 3 °C)  Intake & Output:  I/O       / 0701  / 0700 / 0701   0700  0701   0700    P  O  600 600     Total Intake(mL/kg) 600 (11) 600 (11)     Urine (mL/kg/hr) 1350 (1) 1550 (1 2)     Emesis/NG output 0 0 0    Stool 0 0     Total Output 1350 1550 0    Net -750 -950 0           Unmeasured Urine Occurrence  2 x     Unmeasured Stool Occurrence 1 x 0 x         Weights:   IBW (Ideal Body Weight): 61 5 kg    Body mass index is 19 99 kg/m²  Weight (last 2 days)     None        Physical Exam  Vitals reviewed  Constitutional:       General: He is not in acute distress  Appearance: Normal appearance  He is not ill-appearing  Cardiovascular:      Rate and Rhythm: Normal rate and regular rhythm  Pulses: Normal pulses  Heart sounds: Normal heart sounds  Pulmonary:      Effort: Pulmonary effort is normal       Breath sounds: Normal breath sounds  Abdominal:      General: Abdomen is flat  Bowel sounds are normal  There is no distension  Palpations: Abdomen is soft  Tenderness: There is no abdominal tenderness  There is no guarding  Musculoskeletal:      Right lower leg: No edema  Left lower leg: No edema  Skin:     General: Skin is warm  Neurological:      Mental Status: He is alert  Mental status is at baseline  LABORATORY DATA     Labs: I have personally reviewed pertinent reports  Results from last 7 days   Lab Units 01/06/23  0607 01/05/23  0437 01/04/23  0539 01/03/23  0444   WBC Thousand/uL 8 23 9 28 11 73* 10 53*   HEMOGLOBIN g/dL 8 7* 8 3* 8 4* 7 9*   HEMATOCRIT % 28 4* 28 1* 27 6* 26 0*   PLATELETS Thousands/uL 453* 511* 499* 436*   NEUTROS PCT %  --  79* 83* 81*   MONOS PCT %  --  5 5 6      Results from last 7 days   Lab Units 01/06/23  1014 01/06/23  0607 01/05/23  0437 01/04/23  0539 01/03/23  0444 12/31/22  0519 12/31/22  0047   POTASSIUM mmol/L 3 6 5 7* 4 2 3 3*   < > 3 8 3 9   CHLORIDE mmol/L  --  103 103 104   < > 108 105   CO2 mmol/L  --  27 29 28   < > 27 26   BUN mg/dL  --  24 20 18   < > 38* 39*   CREATININE mg/dL  --  1 29 1 44* 1 28   < > 1 35* 1 40*   CALCIUM mg/dL  --  8 1* 8 2* 8 1*   < > 7 6* 7 8*   ALK PHOS U/L  --   --   --  525*  --  403* 460*   ALT U/L  --   --   --  15  --  9* 13   AST U/L  --   --   --  22  --  17 17    < > = values in this interval not displayed  IMAGING & DIAGNOSTIC TESTING     Radiology Results: I have personally reviewed pertinent reports  CT abdomen pelvis with contrast    Result Date: 12/31/2022  Impression: 1  Percutaneous cholecystostomy catheter seen with its pigtail in the collapsed gallbladder  There is cholelithiasis  There is pericholecystic fluid/fat stranding which is nonspecific in setting of small to moderate ascites  2   Mild diffuse thickening and hyperenhancement of the rectosigmoid colon which may be due to nonspecific colitis/proctitis  Gaseous distention of the transverse and descending colon   3   Mild diffuse thickening of the stomach wall may be due to gastritis  4   Small bilateral pleural effusions  5   Mild thickening of the urinary bladder wall which may be due to chronic bladder outlet obstruction, correlate with urinalysis to exclude cystitis  Workstation performed: [id number]     IR cholecystostomy tube check/change/reposition/reinsertion/upsize    Result Date: 1/4/2023  Impression: Impression: Cholecystostomy in a contracted bladder filled with stones  Filling defects in the cystic and common duct suggestive for stones  Small bowel does opacify however in light of the filling defects in the common bile duct intermittent obstruction cannot be excluded  Workstation performed: [id number]     Other Diagnostic Testing: I have personally reviewed pertinent reports  ACTIVE MEDICATIONS     Current Facility-Administered Medications   Medication Dose Route Frequency   • acetaminophen (TYLENOL) tablet 650 mg  650 mg Oral Q6H PRN   • amLODIPine (NORVASC) tablet 5 mg  5 mg Oral Daily   • aspirin (ECOTRIN LOW STRENGTH) EC tablet 81 mg  81 mg Oral Daily   • atorvastatin (LIPITOR) tablet 20 mg  20 mg Oral QPM   • carvedilol (COREG) tablet 3 125 mg  3 125 mg Oral BID With Meals   • donepezil (ARICEPT) tablet 5 mg  5 mg Oral HS   • heparin (porcine) subcutaneous injection 5,000 Units  5,000 Units Subcutaneous Q8H [facility] & intermediate   • insulin lispro (HumaLOG) 100 units/mL subcutaneous injection 1-5 Units  1-5 Units Subcutaneous 4x Daily (AC & HS)   • pantoprazole (PROTONIX) EC tablet 40 mg  40 mg Oral Daily       VTE Pharmacologic Prophylaxis: Heparin  VTE Mechanical Prophylaxis: sequential compression device    Portions of the record may have been created with voice recognition software  Occasional wrong word or "sound a like" substitutions may have occurred due to the inherent limitations of voice recognition software    Read the chart carefully and recognize, using context, where substitutions have occurred   ==  Umair Frias, [facility]  Internal Medicine Residency PGY-2

## 2023-01-06 NOTE — PLAN OF CARE
Problem: GASTROINTESTINAL - ADULT  Goal: Establish and maintain optimal ostomy function  Description: INTERVENTIONS:  - Assess bowel function  - Encourage oral fluids to ensure adequate hydration  - Administer IV fluids if ordered to ensure adequate hydration   - Administer ordered medications as needed  - Encourage mobilization and activity  - Nutrition services referral to assist patient with appropriate food choices  - Assess stoma site  - Consider wound care consult   Outcome: Progressing

## 2023-01-06 NOTE — ANESTHESIA PREPROCEDURE EVALUATION
Procedure:  ERCP    Relevant Problems   ANESTHESIA (within normal limits)   (-) History of anesthesia complications      CARDIO   (+) A-fib (HCC)   (+) Benign essential hypertension   (+) Hyperlipidemia      ENDO   (+) Type 2 diabetes mellitus without complication (HCC)      GI/HEPATIC  Confirmed NPO appropriate  choledocholithiasis      /RENAL   (+) CKD (chronic kidney disease)      HEMATOLOGY   (+) Iron deficiency anemia      MUSCULOSKELETAL (within normal limits)      NEURO/PSYCH   (+) Dementia (HCC)      PULMONARY   (-) Smoking (former smoker)   (-) URI (upper respiratory infection)        Physical Exam    Airway  Comment: Patient uncooperative with airway exam           Dental   Comment: edentulous,     Cardiovascular  Rate: normal,     Pulmonary  Breath sounds clear to auscultation,     Other Findings        Anesthesia Plan  ASA Score- 3     Anesthesia Type- general with ASA Monitors  Additional Monitors:   Airway Plan: ETT  Comment: Obtained informed consent via phone from patient's real, Apryl Route  I specifically discussed possibility of post-op delirium in setting of baseline dementia  All questions answered          Plan Factors-    Chart reviewed  EKG reviewed  Existing labs reviewed  Patient is not a current smoker  Induction- intravenous  Postoperative Plan-     Informed Consent- Anesthetic plan and risks discussed with healthcare power of  (real, Apryl Route)  I personally reviewed this patient with the CRNA  Discussed and agreed on the Anesthesia Plan with the CRNA             Lab Results   Component Value Date    WBC 8 23 01/06/2023    HGB 8 7 (L) 01/06/2023    HCT 28 4 (L) 01/06/2023    MCV 88 01/06/2023     (H) 01/06/2023     Lab Results   Component Value Date    SODIUM 138 01/06/2023    K 3 6 01/06/2023     01/06/2023    CO2 27 01/06/2023    BUN 24 01/06/2023    CREATININE 1 29 01/06/2023    GLUC 128 01/06/2023    CALCIUM 8 1 (L) 01/06/2023 Lab Results   Component Value Date    ALT 15 01/04/2023    AST 22 01/04/2023    ALKPHOS 525 (H) 01/04/2023     Lab Results   Component Value Date    INR 1 06 04/04/2018    PROTIME 14 1 04/04/2018     Lab Results   Component Value Date    HGBA1C  11/25/2022     HPLC methodology has detected a hemoglobin variant that interferes with testing  HbA1c cannot be accurately measured by [address] methodology  An alternate methodology must be used to assess long term glucose control in this patient  See test GLYCATED HEMOGLOBIN for results      HGBA1C See Note 11/25/2022

## 2023-01-07 VITALS
DIASTOLIC BLOOD PRESSURE: 73 MMHG | BODY MASS INDEX: 20.02 KG/M2 | WEIGHT: 120.15 LBS | HEART RATE: 80 BPM | TEMPERATURE: 98.2 F | RESPIRATION RATE: 18 BRPM | OXYGEN SATURATION: 97 % | SYSTOLIC BLOOD PRESSURE: 147 MMHG | HEIGHT: 65 IN

## 2023-01-07 LAB
ERYTHROCYTE [DISTWIDTH] IN BLOOD BY AUTOMATED COUNT: 17 % (ref 11.6–15.1)
GLUCOSE SERPL-MCNC: 156 MG/DL (ref 65–140)
GLUCOSE SERPL-MCNC: 174 MG/DL (ref 65–140)
HCT VFR BLD AUTO: 28.3 % (ref 36.5–49.3)
HGB BLD-MCNC: 8.7 G/DL (ref 12–17)
MCH RBC QN AUTO: 26.4 PG (ref 26.8–34.3)
MCHC RBC AUTO-ENTMCNC: 30.7 G/DL (ref 31.4–37.4)
MCV RBC AUTO: 86 FL (ref 82–98)
PLATELET # BLD AUTO: 488 THOUSANDS/UL (ref 149–390)
PMV BLD AUTO: 9.2 FL (ref 8.9–12.7)
RBC # BLD AUTO: 3.29 MILLION/UL (ref 3.88–5.62)
WBC # BLD AUTO: 12.5 THOUSAND/UL (ref 4.31–10.16)

## 2023-01-07 RX ORDER — AMOXICILLIN AND CLAVULANATE POTASSIUM 875; 125 MG/1; MG/1
1 TABLET, FILM COATED ORAL EVERY 12 HOURS SCHEDULED
Qty: 14 TABLET | Refills: 0 | Status: SHIPPED | OUTPATIENT
Start: 2023-01-07 | End: 2023-01-14

## 2023-01-07 RX ADMIN — SODIUM CHLORIDE, SODIUM LACTATE, POTASSIUM CHLORIDE, AND CALCIUM CHLORIDE 125 ML/HR: .6; .31; .03; .02 INJECTION, SOLUTION INTRAVENOUS at 06:12

## 2023-01-07 RX ADMIN — AMLODIPINE BESYLATE 5 MG: 5 TABLET ORAL at 09:00

## 2023-01-07 RX ADMIN — CARVEDILOL 3.12 MG: 3.12 TABLET, FILM COATED ORAL at 08:30

## 2023-01-07 RX ADMIN — HEPARIN SODIUM 5000 UNITS: 5000 INJECTION INTRAVENOUS; SUBCUTANEOUS at 05:41

## 2023-01-07 RX ADMIN — INSULIN LISPRO 1 UNITS: 100 INJECTION, SOLUTION INTRAVENOUS; SUBCUTANEOUS at 08:00

## 2023-01-07 RX ADMIN — INSULIN LISPRO 1 UNITS: 100 INJECTION, SOLUTION INTRAVENOUS; SUBCUTANEOUS at 12:52

## 2023-01-07 RX ADMIN — ASPIRIN 81 MG: 81 TABLET ORAL at 09:00

## 2023-01-07 RX ADMIN — PANTOPRAZOLE SODIUM 40 MG: 40 TABLET, DELAYED RELEASE ORAL at 09:00

## 2023-01-07 NOTE — CASE MANAGEMENT
Case Management Discharge Planning Note    Patient name Jordon Lyons  Location /-01 MRN [id number]  : 1945 Date 2023       Current Admission Date: 2022  Current Admission Diagnosis:Cholecystostomy tube dysfunction   Patient Active Problem List    Diagnosis Date Noted   • Choledocholithiasis 2023   • Cholecystostomy tube dysfunction 2023   • A-fib ([facility] [address] ) 2022   • CKD (chronic kidney disease) 2022   • Dementia ([facility] [address] ) 2022   • Cholecystostomy drain infection ([facility] [address] ) 2022   • Gait abnormality 2022   • Language barrier 2022   • Asymptomatic proteinuria 2018   • Iron deficiency anemia 2018   • Benign essential hypertension 2016   • Calcification of coronary artery 2016   • Hyperlipidemia 2016   • Type 2 diabetes mellitus without complication ([facility] [address] )       LOS (days): 6  Geometric Mean LOS (GMLOS) (days): 3 80  Days to GMLOS:-2 1     OBJECTIVE:  Risk of Unplanned Readmission Score: 14 36         Current admission status: Inpatient   Preferred Pharmacy: No Pharmacies Listed  Primary Care Provider: Nathalie Kuo DO    Primary Insurance: MEDICARE  Secondary Insurance: [address] [place] [address]    DISCHARGE DETAILS:    Discharge planning discussed with[de-identified] Carey Jury of Choice: Yes  Comments - Freedom of Choice: CM spoke to Grandy (real) and informed her that transport time has been arranged for 14:30 today  [address] was accepted and reserved  Pt also has aides via the waiver program for 17 hrs per day  IMM reviewed with real López and no questions were offered  Nursing and SLIM aware of transport time    CM contacted family/caregiver?: Yes  Were Treatment Team discharge recommendations reviewed with patient/caregiver?: Yes  Did patient/caregiver verbalize understanding of patient care needs?: Yes  Were patient/caregiver advised of the risks associated with not following Treatment Team discharge recommendations?: Yes    Contacts  Patient Contacts: Kelton Alfonso  Relationship to Patient[de-identified] Family  Contact Method: Phone  Phone Number: [phone]  Reason/Outcome: Continuity of Care, Discharge [address]         Is the patient interested in [facility] at discharge?: Yes  [address] requested[de-identified] Nursing, Physical [address] Name[de-identified] [address] Provider[de-identified] PCP  Home Health Services Needed[de-identified] Evaluate Functional Status and Safety, Gait/ADL Training, Strengthening/Theraputic Exercises to Improve Function  Homebound Criteria Met[de-identified] Requires the Assistance of Another Person for Safe Ambulation or to Leave the Home  Supporting Clincal Findings[de-identified] Fatigues Easliy in [employer], Limited Endurance    DME Referral Provided  Referral made for DME?: No    Other Referral/Resources/Interventions Provided:  Interventions: [facility]  Referral Comments: José Miguel Harvey reserved along with aides through the waiver program-[facility] for 17 hrs per day               Transport at Discharge : [facility]  Dispatcher Contacted: Yes     Transported by [employer] and Unit #): SLETs  ETA of Transport (Date): 01/07/23  ETA of Transport (Time): 1430              IMM Given (Date):: 01/07/23  IMM Given to[de-identified] Patient  Family notified[de-identified] real Alfonso

## 2023-01-07 NOTE — DISCHARGE SUMMARY
[address] [facility]  Discharge- Claudia Franks 1945, 69 y o  male MRN: [id number]  Unit/Bed#: -01 Encounter: [id number]  Primary Care Provider: Taylor Vaughan DO   Date and time admitted to hospital: 12/31/2022 12:06 AM    Discharge diagnosis:    Cholecystostomy tube dysfunction  - status post IR check  Choledocholithiasis - status post ERCP  Benign essential hypertension  HLD   Type 2 DM  Atrial fibrillation  Dementia    Discharging Physician / Practitioner: Ankur Akhtar MD  PCP: Taylor Vaughan DO  Admission Date:   Admission Orders (From admission, onward)     Ordered        01/01/23 1319  Inpatient Admission  Once            12/31/22 0344  Place in Observation  Once                        Discharge Date: 01/07/23      Consultations During Hospital Stay:  · Interventional radiology  · Gastroenterology     Outpatient follow-up requested:  · Primary care physician  · IR  · General surgery    Complications: None    Reason for Admission: Cholecystostomy tube dysfunction    HPI:  Claudia Franks is a 69 y o  male with a PMH of prior stroke, suspected dementia, afib, CKD, T2DM, HLD, and HTN who presents with perc sylvain drain malfunction and increasing abd pain/distention per nursing home  Accurate ROS unable to be obtained from patient due to dementia  However he is currently without pain or other complaint  Per ED provider patient did have some complaints of dull generalized abdominal pain, sent in from outpatient care facility who reports patient has had increasing abdominal pain, distention, and decreased drainage from his percutaneous cholecystostomy tube  Hospital Course:     Patient was hospitalized, he underwent evaluation of cholecystostomy tube by IR  Cholecystostomy tube is functioning properly however there was some choledocholithiasis noted    Patient therefore underwent ERCP with stone removal   Currently he is doing better, cholecystostomy tube is functional, no signs of sepsis  Patie  nt is pleasantly confused which according to family is baseline  Patient be discharged stable condition, he should follow-up with primary care physician as well as surgery  Patient discharged with home with VNA    Condition at Discharge: good     Discharge Day Visit / Exam:     Subjective:    Patient related this morning  Pleasantly confused  Denies any chest pain nausea vomiting  All events reported  Vitals: Blood Pressure: 147/73 (01/07/23 0804)  Pulse: 80 (01/07/23 0804)  Temperature: 98 2 °F (36 8 °C) (01/07/23 0718)  Temp Source: Temporal (01/06/23 1552)  Respirations: 18 (01/07/23 0804)  Height: 5' 5" (165 1 cm) (12/31/22 2017)  Weight - Scale: 54 5 kg (120 lb 2 4 oz) (12/31/22 2017)  SpO2: 97 % (01/07/23 0804)     Exam:   Physical Exam  Vitals and nursing note reviewed  Constitutional:       Appearance: Normal appearance  Comments: Male patient in bed, awake   HENT:      Head: Normocephalic and atraumatic  Right Ear: External ear normal       Left Ear: External ear normal       Nose: Nose normal  No congestion or rhinorrhea  Mouth/Throat:      Mouth: Mucous membranes are moist       Pharynx: Oropharynx is clear  No oropharyngeal exudate or posterior oropharyngeal erythema  Eyes:      General: No scleral icterus  Right eye: No discharge  Left eye: No discharge  Pupils: Pupils are equal, round, and reactive to light  Neck:      Vascular: No carotid bruit  Cardiovascular:      Rate and Rhythm: Normal rate and regular rhythm  Pulses: Normal pulses  Heart sounds: No murmur heard  No friction rub  No gallop  Pulmonary:      Effort: Pulmonary effort is normal  No respiratory distress  Breath sounds: Normal breath sounds  No stridor  No wheezing, rhonchi or rales  Abdominal:      General: Abdomen is flat  Bowel sounds are normal  There is no distension  Palpations: Abdomen is soft  There is no mass  Tenderness: There is no abdominal tenderness  There is no guarding or rebound  Hernia: No hernia is present  Comments:   Pleasantly confused   Musculoskeletal:         General: No swelling, tenderness, deformity or signs of injury  Normal range of motion  Cervical back: Normal range of motion  No rigidity  No muscular tenderness  Lymphadenopathy:      Cervical: No cervical adenopathy  Skin:     General: Skin is warm and dry  Capillary Refill: Capillary refill takes less than 2 seconds  Coloration: Skin is not jaundiced or pale  Findings: No bruising or erythema  Neurological:      General: No focal deficit present  Mental Status: He is alert  Mental status is at baseline  He is disoriented  Cranial Nerves: No cranial nerve deficit  Sensory: No sensory deficit  Motor: No weakness  Coordination: Coordination normal       Deep Tendon Reflexes: Reflexes normal       Comments: Pleasantly confused   Psychiatric:         Mood and Affect: Mood normal          Behavior: Behavior normal          Thought Content: Thought content normal          Judgment: Judgment normal            Discussion with Family: I discussed discharge planning with daughter    Discharge instructions/Information to patient and family:   See after visit summary for information provided to patient and family  Provisions for Follow-Up Care:  See after visit summary for information related to follow-up care and any pertinent home health orders  Disposition:     Home with VNA    For Discharges to [facility] SNF:   · Not Applicable to this Patient - Not Applicable to this Patient    Planned Readmission: No     Discharge Statement:  I spent 45 minutes discharging the patient  This time was spent on the day of discharge  I had direct contact with the patient on the day of discharge   Greater than 50% of the total time was spent examining patient, answering all patient questions, arranging and discussing plan of care with patient as well as directly providing post-discharge instructions  Additional time then spent on discharge activities  Discharge Medications:  See after visit summary for reconciled discharge medications provided to patient and family        ** Please Note: This note has been constructed using a voice recognition system **

## 2023-01-07 NOTE — PLAN OF CARE
Problem: Potential for Falls  Goal: Patient will remain free of falls  Description: INTERVENTIONS:  - Educate patient/family on patient safety including physical limitations  - Instruct patient to call for assistance with activity   - Consult OT/PT to assist with strengthening/mobility   - Keep Call bell within reach  - Keep bed low and locked with side rails adjusted as appropriate  - Keep care items and personal belongings within reach  - Initiate and maintain comfort rounds  - Make Fall Risk Sign visible to staff  - Offer Toileting every 2 Hours, in advance of need  - Initiate/Maintain Bed alarm  - Obtain necessary fall risk management equipment: Bed Alarm  - Apply yellow socks and bracelet for high fall risk patients  - Consider moving patient to room near nurses station  Outcome: Progressing     Problem: Prexisting or High Potential for Compromised Skin Integrity  Goal: Skin integrity is maintained or improved  Description: INTERVENTIONS:  - Identify patients at risk for skin breakdown  - Assess and monitor skin integrity  - Assess and monitor nutrition and hydration status  - Monitor labs   - Assess for incontinence   - Turn and reposition patient  - Assist with mobility/ambulation  - Relieve pressure over bony prominences  - Avoid friction and shearing  - Provide appropriate hygiene as needed including keeping skin clean and dry  - Evaluate need for skin moisturizer/barrier cream  - Collaborate with interdisciplinary team   - Patient/family teaching  - Consider wound care consult   Outcome: Progressing     Problem: MOBILITY - ADULT  Goal: Maintain or return to baseline ADL function  Description: INTERVENTIONS:  -  Assess patient's ability to carry out ADLs; assess patient's baseline for ADL function and identify physical deficits which impact ability to perform ADLs (bathing, care of mouth/teeth, toileting, grooming, dressing, etc )  - Assess/evaluate cause of self-care deficits   - Assess range of motion  - Assess patient's mobility; develop plan if impaired  - Assess patient's need for assistive devices and provide as appropriate  - Encourage maximum independence but intervene and supervise when necessary  - Involve family in performance of ADLs  - Assess for home care needs following discharge   - Consider OT consult to assist with ADL evaluation and planning for discharge  - Provide patient education as appropriate  Outcome: Progressing  Goal: Maintains/Returns to pre admission functional level  Description: INTERVENTIONS:  - Perform BMAT or MOVE assessment daily    - Set and communicate daily mobility goal to care team and patient/family/caregiver  - Collaborate with rehabilitation services on mobility goals if consulted  - Perform Range of Motion 4 times a day  - Reposition patient every 2 hours    - Dangle patient 4 times a day  - Stand patient 4 times a day  - Ambulate patient 4 times a day  - Out of bed to chair 3 times a day   - Out of bed for meals 3 times a day  - Out of bed for toileting  - Record patient progress and toleration of activity level   Outcome: Progressing     Problem: GASTROINTESTINAL - ADULT  Goal: Minimal or absence of nausea and/or vomiting  Description: INTERVENTIONS:  - Administer IV fluids if ordered to ensure adequate hydration  - Maintain NPO status until nausea and vomiting are resolved  - Nasogastric tube if ordered  - Administer ordered antiemetic medications as needed  - Provide nonpharmacologic comfort measures as appropriate  - Advance diet as tolerated, if ordered  - Consider nutrition services referral to assist patient with adequate nutrition and appropriate food choices  Outcome: Progressing  Goal: Maintains or returns to baseline bowel function  Description: INTERVENTIONS:  - Assess bowel function  - Encourage oral fluids to ensure adequate hydration  - Administer IV fluids if ordered to ensure adequate hydration  - Administer ordered medications as needed  - Encourage mobilization and activity  - Consider nutritional services referral to assist patient with adequate nutrition and appropriate food choices  Outcome: Progressing  Goal: Maintains adequate nutritional intake  Description: INTERVENTIONS:  - Monitor percentage of each meal consumed  - Identify factors contributing to decreased intake, treat as appropriate  - Assist with meals as needed  - Monitor I&O, weight, and lab values if indicated  - Obtain nutrition services referral as needed  Outcome: Progressing  Goal: Establish and maintain optimal ostomy function  Description: INTERVENTIONS:  - Assess bowel function  - Encourage oral fluids to ensure adequate hydration  - Administer IV fluids if ordered to ensure adequate hydration   - Administer ordered medications as needed  - Encourage mobilization and activity  - Nutrition services referral to assist patient with appropriate food choices  - Assess stoma site  - Consider wound care consult   Outcome: Progressing  Goal: Oral mucous membranes remain intact  Description: INTERVENTIONS  - Assess oral mucosa and hygiene practices  - Implement preventative oral hygiene regimen  - Implement oral medicated treatments as ordered  - Initiate Nutrition services referral as needed  Outcome: Progressing

## 2023-01-10 LAB — BACTERIA BLD CULT: NORMAL

## 2023-01-12 NOTE — DISCHARGE INSTRUCTIONS
TUBE CARE INSTRUCTIONS    Care after your procedure:    Resume your normal diet  Small sips of flat soda will help with nausea  1  The properly functioning catheter should be forward flushed once (1x) daily with 10ml of normal saline using clean technique  You will be given a prescription for flushes  To flush the tube, clean both connections with alcohol swab  Twist off the drainage bag/ bulb  tubing and twist the saline syringe into the drainage tube and flush  Remove the syringe and twist the drainage bag / bulb tubing tubing back on     2  The drainage bag/bulb may be emptied as necessary  Keep a record of the amount of fluid you drain from your tube  This should be done with clean technique as well  3  A fresh dressing should be applied daily over the tube insertion site  4  As the tube is secured to the skin with only a suture,try not to pull on your tube  Tub baths are not permitted  Showers are permitted if the patient's skin entry site is prevented from getting wet  Similarly, washcloth "baths" are acceptable  Contact Interventional Radiology at [phone] [place] PATIENTS: Contact Interventional Radiology at [phone]) Martha Lyn PATIENTS: Contact Interventional Radiology at [phone]) if:    1  Leakage or large amounts of liquid around the catheter  2  Fever of 101 degrees lasting several hours without other obvious cause (such as sore throat, flu, etc)  3  Persistent nausea or vomiting  4  Diminished drainage, which may be associated with pressure or pain  Or when the     drainage from your tube is less than 10mls for 48 hours  5  Catheter pulled back or falls out  The following pharmacies carry the flush syringes         [facility]                     [facility]  [address]                         [address] [facility] [place]  Phone [phone]            Phone [phone]   [place] [address]                                [phone]  [address] [facility] [place] [place]                      [address] [place]  Phone [phone]            Phone [phone]                      [place]                                                                                                          [phone]  [facility]  [facility] [address]    [address]  Phone [phone]